# Patient Record
Sex: FEMALE | Race: BLACK OR AFRICAN AMERICAN | NOT HISPANIC OR LATINO | Employment: OTHER | URBAN - METROPOLITAN AREA
[De-identification: names, ages, dates, MRNs, and addresses within clinical notes are randomized per-mention and may not be internally consistent; named-entity substitution may affect disease eponyms.]

---

## 2017-01-10 DIAGNOSIS — H40.10X2 OPEN-ANGLE GLAUCOMA, MODERATE STAGE: ICD-10-CM

## 2017-01-10 DIAGNOSIS — H20.13 CHRONIC IRIDOCYCLITIS, BILATERAL: Primary | ICD-10-CM

## 2017-01-10 RX ORDER — BRIMONIDINE TARTRATE 1.5 MG/ML
1 SOLUTION/ DROPS OPHTHALMIC 2 TIMES DAILY
Qty: 1 BOTTLE | Refills: 12 | Status: SHIPPED | OUTPATIENT
Start: 2017-01-10 | End: 2017-03-22

## 2017-01-10 NOTE — TELEPHONE ENCOUNTER
[facility] [place] called needed Rx for Brimondine for patient.  She saw Dr. Zepeda in March of 2016, he said continue meds.  [facility]

## 2017-02-03 DIAGNOSIS — K59.09 CHRONIC CONSTIPATION: Primary | ICD-10-CM

## 2017-02-03 DIAGNOSIS — H20.13 CHRONIC IRIDOCYCLITIS, BILATERAL: ICD-10-CM

## 2017-02-07 RX ORDER — DOCUSATE SODIUM 100 MG/1
100 CAPSULE, LIQUID FILLED ORAL 2 TIMES DAILY PRN
Qty: 180 CAPSULE | Refills: 4 | Status: SHIPPED | OUTPATIENT
Start: 2017-02-07 | End: 2018-04-29

## 2017-02-07 NOTE — TELEPHONE ENCOUNTER
Pt advised multiple times to schedule appointment, no appointment made. Last ov oct 2015.  Rachael Pringle RN

## 2017-02-21 RX ORDER — PREDNISOLONE ACETATE 10 MG/ML
1 SUSPENSION/ DROPS OPHTHALMIC 2 TIMES DAILY
Qty: 10 ML | Refills: 1 | Status: SHIPPED | OUTPATIENT
Start: 2017-02-21 | End: 2017-03-22

## 2017-02-24 DIAGNOSIS — I25.10 CORONARY ARTERY DISEASE INVOLVING NATIVE CORONARY ARTERY OF NATIVE HEART WITHOUT ANGINA PECTORIS: ICD-10-CM

## 2017-02-24 DIAGNOSIS — I10 HYPERTENSION GOAL BP (BLOOD PRESSURE) < 140/90: ICD-10-CM

## 2017-03-07 DIAGNOSIS — I25.10 CORONARY ARTERY DISEASE INVOLVING NATIVE CORONARY ARTERY OF NATIVE HEART WITHOUT ANGINA PECTORIS: ICD-10-CM

## 2017-03-07 DIAGNOSIS — I10 HYPERTENSION GOAL BP (BLOOD PRESSURE) < 140/90: ICD-10-CM

## 2017-03-09 NOTE — TELEPHONE ENCOUNTER
Last request for refill pharmacy was called and they were sending request to other provider. Patient has not been seen in over a year and appears is seeing other provider. Pharmacy not open at this time and unable to leave message. Maryann Elliott RN

## 2017-03-22 ENCOUNTER — OFFICE VISIT (OUTPATIENT)
Dept: OPTOMETRY | Facility: CLINIC | Age: 72
End: 2017-03-22
Payer: COMMERCIAL

## 2017-03-22 DIAGNOSIS — H35.363 MACULAR DRUSEN, BILATERAL: ICD-10-CM

## 2017-03-22 DIAGNOSIS — H04.123 DRY EYE SYNDROME, BILATERAL: ICD-10-CM

## 2017-03-22 DIAGNOSIS — H52.201 ASTIGMATISM OF RIGHT EYE: ICD-10-CM

## 2017-03-22 DIAGNOSIS — Z98.890 HISTORY OF LASER IRIDOTOMY: ICD-10-CM

## 2017-03-22 DIAGNOSIS — H52.4 MYOPIA WITH PRESBYOPIA OF BOTH EYES: ICD-10-CM

## 2017-03-22 DIAGNOSIS — H21.511: ICD-10-CM

## 2017-03-22 DIAGNOSIS — H40.10X2 OPEN-ANGLE GLAUCOMA OF BOTH EYES, MODERATE STAGE, UNSPECIFIED OPEN-ANGLE GLAUCOMA TYPE: Primary | ICD-10-CM

## 2017-03-22 DIAGNOSIS — Z96.1 PSEUDOPHAKIA: ICD-10-CM

## 2017-03-22 DIAGNOSIS — H20.13 CHRONIC IRIDOCYCLITIS, BILATERAL: ICD-10-CM

## 2017-03-22 DIAGNOSIS — H52.13 MYOPIA WITH PRESBYOPIA OF BOTH EYES: ICD-10-CM

## 2017-03-22 PROCEDURE — 92014 COMPRE OPH EXAM EST PT 1/>: CPT | Performed by: OPTOMETRIST

## 2017-03-22 PROCEDURE — 92015 DETERMINE REFRACTIVE STATE: CPT | Performed by: OPTOMETRIST

## 2017-03-22 RX ORDER — LATANOPROST 50 UG/ML
1 SOLUTION/ DROPS OPHTHALMIC AT BEDTIME
Qty: 2.5 ML | Refills: 1 | Status: SHIPPED | OUTPATIENT
Start: 2017-03-22 | End: 2017-05-05

## 2017-03-22 RX ORDER — CYCLOSPORINE 0.5 MG/ML
1 EMULSION OPHTHALMIC EVERY 12 HOURS
Qty: 1 BOX | Refills: 2 | Status: SHIPPED | OUTPATIENT
Start: 2017-03-22 | End: 2017-06-17

## 2017-03-22 RX ORDER — PREDNISOLONE ACETATE 10 MG/ML
1 SUSPENSION/ DROPS OPHTHALMIC 2 TIMES DAILY
Qty: 10 ML | Refills: 1 | Status: SHIPPED | OUTPATIENT
Start: 2017-03-22 | End: 2017-09-29

## 2017-03-22 RX ORDER — BRIMONIDINE TARTRATE 1.5 MG/ML
1 SOLUTION/ DROPS OPHTHALMIC 2 TIMES DAILY
Qty: 1 BOTTLE | Refills: 1 | Status: SHIPPED | OUTPATIENT
Start: 2017-03-22 | End: 2017-05-05

## 2017-03-22 ASSESSMENT — REFRACTION_MANIFEST
OS_SPHERE: -1.75
OD_ADD: +3.00
OD_CYLINDER: +0.50
OD_AXIS: 175
OS_ADD: +3.00
OD_SPHERE: -0.25
OS_CYLINDER: SPHERE

## 2017-03-22 ASSESSMENT — VISUAL ACUITY
OS_SC: 20/80
OD_CC: 20/40
OD_CC: 20/30
METHOD: SNELLEN - LINEAR
OS_SC: 20/125
OD_SC: 20/30
OD_SC: 20/200
OS_CC: 20/40
CORRECTION_TYPE: GLASSES
OS_CC: 20/40

## 2017-03-22 ASSESSMENT — EXTERNAL EXAM - RIGHT EYE: OD_EXAM: NORMAL

## 2017-03-22 ASSESSMENT — CONF VISUAL FIELD
OS_NORMAL: 1
OD_NORMAL: 1

## 2017-03-22 ASSESSMENT — REFRACTION_WEARINGRX
OS_SPHERE: -1.25
OD_SPHERE: -0.50
SPECS_TYPE: BIFOCAL
OS_ADD: +2.75
OS_CYLINDER: SPHERE
OD_CYLINDER: +0.75
OD_ADD: +2.75
OD_AXIS: 169

## 2017-03-22 ASSESSMENT — EXTERNAL EXAM - LEFT EYE: OS_EXAM: NORMAL

## 2017-03-22 ASSESSMENT — SLIT LAMP EXAM - LIDS
COMMENTS: NORMAL
COMMENTS: NORMAL

## 2017-03-22 ASSESSMENT — TONOMETRY
OD_IOP_MMHG: 18
IOP_METHOD: APPLANATION
OS_IOP_MMHG: 18

## 2017-03-22 ASSESSMENT — CUP TO DISC RATIO
OD_RATIO: 0.5
OS_RATIO: 0.6

## 2017-03-22 NOTE — PATIENT INSTRUCTIONS
A final glasses prescription was given.  Allow time for adaptation.  The glasses may cause dizziness and affect depth perception for awhile.  Continue glaucoma medications as directed  Restasis ophthalmic emulsion, place 1 drop in each eye 2 times per day  Pred Forte 1%, place 1 drop in right eye 2 times daily  Systane Balance, place 1 drop in each eye 2-4 times daily  Return to clinic 1 year for Comprehensive Vision Exam      Trisha Sepulveda O.D  [address]  [address] [phone]

## 2017-03-22 NOTE — PROGRESS NOTES
Chief Complaint   Patient presents with     COMPREHENSIVE EYE EXAM      Accompanied by self  Last Eye Exam: 1 year ago  Dilated Previously: Yes    What are you currently using to see?  glasses       Distance Vision Acuity: Noticed gradual change in both eyes    Near Vision Acuity: Not satisfied     Eye Comfort: dry and itchy  Do you use eye drops? : Yes:   Occupation or Hobbies: retired    Abby Villasenor, Optometric Tech          Medical, surgical and family histories reviewed and updated 3/22/2017.       OBJECTIVE: See Ophthalmology exam    ASSESSMENT:    ICD-10-CM    1. Open-angle glaucoma of both eyes, moderate stage, unspecified open-angle glaucoma type H40.10X2 EYE EXAM (SIMPLE-NONBILLABLE)     OPHTHALMOLOGY ADULT REFERRAL     latanoprost (XALATAN) 0.005 % ophthalmic solution     brimonidine (ALPHAGAN-P) 0.15 % ophthalmic solution   2. Macular drusen, bilateral H35.363 EYE EXAM (SIMPLE-NONBILLABLE)   3. 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 Pseudophakia, ou Z96.1 EYE EXAM (SIMPLE-NONBILLABLE)   5. History of laser iridotomy, ou Z98.890 EYE EXAM (SIMPLE-NONBILLABLE)   6. Dry eye syndrome, bilateral H04.123 EYE EXAM (SIMPLE-NONBILLABLE)     propylene glycol (SYSTANE BALANCE) 0.6 % SOLN ophthalmic solution   7. Myopia with presbyopia of both eyes H52.13 EYE EXAM (SIMPLE-NONBILLABLE)    H52.4 REFRACTION   8. Astigmatism of right eye H52.201 EYE EXAM (SIMPLE-NONBILLABLE)     REFRACTION   9. Chronic iridocyclitis, bilateral H20.13 prednisoLONE acetate (PRED FORTE) 1 % ophthalmic susp     cycloSPORINE (RESTASIS) 0.05 % ophthalmic emulsion     brimonidine (ALPHAGAN-P) 0.15 % ophthalmic solution      PLAN:   A final glasses prescription was given.  Allow time for adaptation.  The glasses may cause dizziness and affect depth perception for awhile.  Continue glaucoma medications as directed  Restasis ophthalmic emulsion, place 1 drop in each eye 2 times per day  Pred Forte 1%, place  1 drop in right eye 2 times daily  Systane Balance, place 1 drop in each eye 2-4 times daily  Return to clinic 1 year for Comprehensive Vision Exam      Trisha Sepulveda O.D  [address]  [place]  [address]    [phone]

## 2017-03-22 NOTE — MR AVS SNAPSHOT
After Visit Summary   3/22/2017    Loly Govea    MRN: [id number]           Patient Information     Date Of Birth          1945        Visit Information        Provider Department      3/22/2017 2:00 PM Trisha Sepulveda, OD [facility]        Today's Diagnoses     Open-angle glaucoma of both eyes, moderate stage, unspecified open-angle glaucoma type    -  1    Macular drusen, bilateral        Anterior synechiae of iris, right        Pseudophakia, ou        History of laser iridotomy, ou        Dry eye syndrome, bilateral        Myopia with presbyopia of both eyes        Astigmatism of right eye        Chronic iridocyclitis, bilateral          Care Instructions        A final glasses prescription was given.  Allow time for adaptation.  The glasses may cause dizziness and affect depth perception for awhile.  Continue glaucoma medications as directed  Restasis ophthalmic emulsion, place 1 drop in each eye 2 times per day  Pred Forte 1%, place 1 drop in right eye 2 times daily  Systane Balance, place 1 drop in each eye 2-4 times daily  Return to clinic 1 year for Comprehensive Vision Exam      Trisha Sepulveda O.D  [address] [place]  [address]    [phone]                Follow-ups after your visit        Additional Services     OPHTHALMOLOGY ADULT REFERRAL       Your provider has referred you to:  [facility]: [facility] [phone]   [email or web address]      Please be aware that coverage of these services is subject to the terms and limitations of your health insurance plan.  Call member services at your health plan with any benefit or coverage questions.      Please bring the following to your appointment:  >>   Any x-rays, CTs or MRIs which have been performed.  Contact the facility where they were done to arrange for  prior to your scheduled appointment.  Any new CT, MRI or other  procedures ordered by your specialist must be performed at a [facility] facility or coordinated by your clinic's referral office.    >>   List of current medications   >>   This referral request   >>   Any documents/labs given to you for this referral                  Follow-up notes from your care team     Return in about 1 year (around 3/22/2018) for Eye Exam.      Who to contact     If you have questions or need follow up information about today's clinic visit or your schedule please contact [facility] directly at [phone].  Normal or non-critical lab and imaging results will be communicated to you by Enforahart, letter or phone within 4 business days after the clinic has received the results. If you do not hear from us within 7 days, please contact the clinic through [email or web address] or phone. If you have a critical or abnormal lab result, we will notify you by phone as soon as possible.  Submit refill requests through Octoshape or call your pharmacy and they will forward the refill request to us. Please allow 3 business days for your refill to be completed.          Additional Information About Your Visit        Enforahart Information     Octoshape gives you secure access to your electronic health record. If you see a primary care provider, you can also send messages to your care team and make appointments. If you have questions, please call your primary care clinic.  If you do not have a primary care provider, please call [phone] and they will assist you.        Care EveryWhere ID     This is your Care EveryWhere ID. This could be used by other organizations to access your [facility] medical records  [id number]         Blood Pressure from Last 3 Encounters:   09/15/15 90/60   04/28/15 120/72   12/18/14 (!) 86/55    Weight from Last 3 Encounters:   09/15/15 94.3 kg (208 lb)   04/28/15 86.2 kg (190 lb)   03/24/15 85.3 kg (188 lb)              We Performed the Following     EYE EXAM (SIMPLE-NONBILLABLE)      OPHTHALMOLOGY ADULT REFERRAL     REFRACTION          Today's Medication Changes          These changes are accurate as of: 3/22/17  4:18 PM.  If you have any questions, ask your nurse or doctor.               Start taking these medicines.        Dose/Directions    propylene glycol 0.6 % Soln ophthalmic solution   Commonly known as:  SYSTANE BALANCE   Used for:  Dry eye syndrome, bilateral   Started by:  Trisha Sepulveda, OD        Dose:  1 drop   Place 1 drop into both eyes 4 times daily   Quantity:  1 Bottle   Refills:  11            Where to get your medicines      These medications were sent to [id number] - [place], [place] - [address]  [address] [place] [address]     Phone:  [phone]     brimonidine 0.15 % ophthalmic solution    cycloSPORINE 0.05 % ophthalmic emulsion    latanoprost 0.005 % ophthalmic solution    prednisoLONE acetate 1 % ophthalmic susp    propylene glycol 0.6 % Soln ophthalmic solution                Primary Care Provider Office Phone # Fax #    Rigo Cheng [phone]       [facility] [address] [facility] [place] [address]        Thank you!     Thank you for choosing [facility]  for your care. Our goal is always to provide you with excellent care. Hearing back from our patients is one way we can continue to improve our services. Please take a few minutes to complete the written survey that you may receive in the mail after your visit with us. Thank you!             Your Updated Medication List - Protect others around you: Learn how to safely use, store and throw away your medicines at www.disposemymeds.org.          This list is accurate as of: 3/22/17  4:18 PM.  Always use your most recent med list.                   Brand Name Dispense Instructions for use    acetaminophen 500 MG Caps     200 capsule    Take 1-2 capsules by mouth every 6 hours As needed for pain       * albuterol 108 (90 BASE) MCG/ACT Inhaler     PROAIR HFA/PROVENTIL HFA/VENTOLIN HFA    3 Inhaler    Inhale 2 puffs into the lungs every 4 hours as needed for shortness of breath / dyspnea or wheezing (for shortness of breath/ dyspnea)       * albuterol (2.5 MG/3ML) 0.083% neb solution     30 vial    Take 3 mLs (2.5 mg) by nebulization every 6 hours as needed for shortness of breath / dyspnea       alendronate 70 MG tablet    FOSAMAX    4 tablet    Take 1 tablet (70 mg) by mouth every 7 days Take with over 8 ounces water and stay upright for at least 30 minutes after dose.  Take at least 60 minutes before breakfast.  OVERDUE FOR DEXA SCAN       aspirin 81 MG tablet     90 tablet    Take 1 tablet (81 mg) by mouth daily       atenolol 50 MG tablet    TENORMIN    90 tablet    Take 1 tablet (50 mg) by mouth daily       atorvastatin 20 MG tablet    LIPITOR    90 tablet    Take 1 tablet (20 mg) by mouth daily for cholesterol Pharmacy ok to hold prescription until due       azelastine 0.1 % spray    ASTELIN    1 Bottle    Spray 1 spray in nostril 2 times daily as needed for rhinitis       brimonidine 0.15 % ophthalmic solution    ALPHAGAN-P    1 Bottle    Place 1 drop into both eyes 2 times daily       budesonide-formoterol 160-4.5 MCG/ACT Inhaler    SYMBICORT    3 Inhaler    Inhale 2 puffs into the lungs 2 times daily For asthma control Pharmacy ok to hold prescription until due       buPROPion 150 MG 12 hr tablet    WELLBUTRIN SR    60 tablet    Take 1 tablet once daily and increase to 1 tablet twice daily after 4 to 7 days (replaces cymbalta for energy/ weight loss/depression)       CALCIUM 600/VITAMIN D3 600-800 MG-UNIT Tabs   Generic drug:  Calcium Carb-Cholecalciferol     1 tablet    Take 1 tablet by mouth 2 times daily With magnesium 50 mg       CRANBERRY      1 cap daily       cycloSPORINE 0.05 % ophthalmic emulsion    RESTASIS    1 Box    Place 1 drop into both eyes every 12 hours       diazepam 2 MG tablet    VALIUM    30 tablet    1-2 pills every 12 hours a  needed for muscle spasms or sleep max#30/month don't take with percocet       diphenhydrAMINE 25 MG tablet    BENADRYL    60 tablet    Take 1-2 tablets (25-50 mg) by mouth every 6 hours as needed for itching or allergies APPT NEEDED FOR FURTHER REFILLS       docusate sodium 100 MG capsule    COLACE    180 capsule    Take 1 capsule (100 mg) by mouth 2 times daily as needed for constipation       DULoxetine 60 MG EC capsule    CYMBALTA    90 capsule    Take 1 capsule (60 mg) by mouth daily       EQL CENTRAL-KAVEH SELECT Tabs     100 tablet    Take 1 tablet by mouth daily APPT NEEDED FOR FURTHER REFILLS       furosemide 20 MG tablet    LASIX    90 tablet    Take 1 tablet (20 mg) by mouth daily TAKE AT THE SAME TIME AS POTASSIUM. Pharmacy ok to hold prescription until due       HYDROPHOR Oint     454 g    Apply sparingly topically as needed for dry skin       isosorbide mononitrate 30 MG 24 hr tablet    IMDUR    90 tablet    Take 3 tablets (90 mg) by mouth daily       latanoprost 0.005 % ophthalmic solution    XALATAN    2.5 mL    Place 1 drop into both eyes At Bedtime       lisinopril 20 MG tablet    PRINIVIL/ZESTRIL    180 tablet    Take 1 tablet (20 mg) by mouth 2 times daily This is for blood pressure minus the hydrochlorothiazine       LUMIGAN 0.03 % ophthalmic solution   Generic drug:  bimatoprost     1 Bottle    Place 1 drop into both eyes At Bedtime       magnesium 250 MG tablet     30 tablet    Take 1 tablet by mouth daily       metoprolol 50 MG tablet    LOPRESSOR    180 tablet    Take 1 tablet (50 mg) by mouth 2 times daily       nitroglycerin 0.4 MG sublingual tablet    NITROSTAT    20 tablet    Place 1 tablet under the tongue. 1 pill every as needed for chest pain. maybe repeat [id number] x3 total than call 911 if pain persists       order for DME     1 each    Equipment being ordered: Nebulizer with tubing and mouthpiece or mask       oxyCODONE-acetaminophen 5-325 MG per tablet    PERCOCET    60 tablet     1/2 to 1 whole pill every 6 hours as needed for pain. Max #60/month       pantoprazole 40 MG EC tablet    PROTONIX    90 tablet    Take 1 tablet (40 mg) by mouth daily For heartburn.       polyethylene glycol 0.4%- propylene glycol 0.3% 0.4-0.3 % Soln ophthalmic solution    SYSTANE ULTRA    1 Bottle    Place 1 drop into both eyes 4 times daily as needed for dry eyes       polyethylene glycol powder    MIRALAX    510 g    Take 17 g by mouth daily       potassium chloride SA 10 MEQ CR tablet    K-DUR/KLOR-CON M    90 tablet    Take 1 tablet (10 mEq) by mouth daily With lasix Pharmacy ok to hold prescription until due       prednisoLONE acetate 1 % ophthalmic susp    PRED FORTE    10 mL    Place 1 drop into the right eye 2 times daily       propylene glycol 0.6 % Soln ophthalmic solution    SYSTANE BALANCE    1 Bottle    Place 1 drop into both eyes 4 times daily       rOPINIRole 0.25 MG tablet    REQUIP    30 tablet    Take 1 tablet (0.25 mg) by mouth At Bedtime For restless legs. May double dosage if not effective       senna 8.6 MG tablet    SENOKOT    180 tablet    Take 1 tablet by mouth 2 times daily as needed for constipation       sodium chloride 0.65 % nasal spray    OCEAN NASAL SPRAY    1 Bottle    Spray 1 spray into both nostrils daily as needed for congestion       tiZANidine 2 MG tablet    ZANAFLEX    90 tablet    Take 1 tablet (2 mg) by mouth 3 times daily as needed for muscle spasms       triamcinolone 0.025 % cream    KENALOG    90 g    Apply topically 2 times daily as needed (to rash)       * Notice:  This list has 2 medication(s) that are the same as other medications prescribed for you. Read the directions carefully, and ask your doctor or other care provider to review them with you.

## 2017-04-03 DIAGNOSIS — I10 ESSENTIAL HYPERTENSION WITH GOAL BLOOD PRESSURE LESS THAN 140/90: ICD-10-CM

## 2017-04-03 DIAGNOSIS — G25.81 RESTLESS LEGS SYNDROME: ICD-10-CM

## 2017-04-03 RX ORDER — ATENOLOL 50 MG/1
50 TABLET ORAL DAILY
Qty: 90 TABLET | Refills: 1 | OUTPATIENT
Start: 2017-04-03

## 2017-04-03 RX ORDER — ROPINIROLE 0.25 MG/1
0.25 TABLET, FILM COATED ORAL AT BEDTIME
Qty: 30 TABLET | Refills: 10 | OUTPATIENT
Start: 2017-04-03

## 2017-04-07 DIAGNOSIS — I10 ESSENTIAL HYPERTENSION WITH GOAL BLOOD PRESSURE LESS THAN 140/90: ICD-10-CM

## 2017-04-07 RX ORDER — ATENOLOL 50 MG/1
50 TABLET ORAL DAILY
Qty: 90 TABLET | Refills: 3 | Status: SHIPPED | OUTPATIENT
Start: 2017-04-07

## 2017-04-10 DIAGNOSIS — G25.81 RESTLESS LEGS SYNDROME: ICD-10-CM

## 2017-04-10 RX ORDER — ROPINIROLE 0.25 MG/1
0.25 TABLET, FILM COATED ORAL AT BEDTIME
Qty: 30 TABLET | Refills: 10 | Status: CANCELLED | OUTPATIENT
Start: 2017-04-10

## 2017-04-12 NOTE — TELEPHONE ENCOUNTER
Request for requip refill. Patient no longer seeing provider at this clinic. Pharmacy has been notified that refills need to go to other provider. Maryann Elliott RN

## 2017-04-13 DIAGNOSIS — G25.81 RESTLESS LEGS SYNDROME: ICD-10-CM

## 2017-04-14 DIAGNOSIS — G25.81 RESTLESS LEGS SYNDROME: ICD-10-CM

## 2017-04-14 RX ORDER — ROPINIROLE 0.25 MG/1
TABLET, FILM COATED ORAL
Qty: 1 TABLET | Refills: 0 | OUTPATIENT
Start: 2017-04-14

## 2017-04-14 RX ORDER — ROPINIROLE 0.25 MG/1
0.25 TABLET, FILM COATED ORAL AT BEDTIME
Qty: 30 TABLET | Refills: 10 | OUTPATIENT
Start: 2017-04-14

## 2017-05-03 DIAGNOSIS — M81.0 OSTEOPOROSIS: ICD-10-CM

## 2017-05-03 RX ORDER — ALENDRONATE SODIUM 70 MG/1
70 TABLET ORAL
Qty: 4 TABLET | Refills: 3 | OUTPATIENT
Start: 2017-05-03

## 2017-06-12 DIAGNOSIS — M79.7 FIBROMYALGIA: ICD-10-CM

## 2017-06-17 DIAGNOSIS — H20.13 CHRONIC IRIDOCYCLITIS, BILATERAL: ICD-10-CM

## 2017-06-19 DIAGNOSIS — M79.7 FIBROMYALGIA: ICD-10-CM

## 2017-06-19 RX ORDER — CYCLOSPORINE 0.5 MG/ML
EMULSION OPHTHALMIC
Qty: 60 EACH | Refills: 1 | Status: SHIPPED | OUTPATIENT
Start: 2017-06-19 | End: 2017-11-03

## 2017-06-19 NOTE — TELEPHONE ENCOUNTER
cycloSPORINE (RESTASIS) 0.05 % ophthalmic emulsion      Last Written Prescription Date:  3/22/2017  Last Fill Quantity: 1 box,   # refills: 2  [place] [facility] or [facility] prescribing provider: 3/22/2017  Future Office visit:       Routing refill request to provider for review/approval because:  Drug not on the [facility] [facility] or [facility] refill protocol or controlled substance

## 2017-08-31 DIAGNOSIS — H40.10X2 OPEN-ANGLE GLAUCOMA OF BOTH EYES, MODERATE STAGE, UNSPECIFIED OPEN-ANGLE GLAUCOMA TYPE: ICD-10-CM

## 2017-08-31 RX ORDER — LATANOPROST 50 UG/ML
SOLUTION/ DROPS OPHTHALMIC
Qty: 2.5 ML | Refills: 0 | Status: SHIPPED | OUTPATIENT
Start: 2017-08-31 | End: 2018-05-22

## 2017-08-31 NOTE — TELEPHONE ENCOUNTER
Routing refill request to provider for review/approval because:  Priti given x1 and patient did not follow up, please advise      Catia Peñaloza RN - BC

## 2017-08-31 NOTE — TELEPHONE ENCOUNTER
latanoprost (XALATAN) 0.005 % ophthalmic solution   Last Written Prescription Date: 05/08/2017  Last Fill Quantity: 2.5mL,  # refills: 1   Last Office Visit with [facility], [facility] or [facility] prescribing provider: 10/16/2015                                           Anna Zarate MA

## 2017-09-22 DIAGNOSIS — M81.0 OSTEOPOROSIS: ICD-10-CM

## 2017-09-22 RX ORDER — ALENDRONATE SODIUM 70 MG/1
70 TABLET ORAL
Qty: 4 TABLET | Refills: 3 | OUTPATIENT
Start: 2017-09-22

## 2017-09-29 DIAGNOSIS — H20.13 CHRONIC IRIDOCYCLITIS, BILATERAL: ICD-10-CM

## 2017-09-29 RX ORDER — PREDNISOLONE ACETATE 10 MG/ML
SUSPENSION/ DROPS OPHTHALMIC
Qty: 5 ML | Refills: 0 | Status: SHIPPED | OUTPATIENT
Start: 2017-09-29 | End: 2018-05-22

## 2017-09-29 NOTE — TELEPHONE ENCOUNTER
Refilled Prednisolone x1 for patient.  She really needs to get in with Dr. Zepeda, no showed last appt and saw Dr. Sepulveda in between as well.  Needs Glaucoma OCT and HVF,  to call patient for appointment.

## 2017-10-09 DIAGNOSIS — M81.0 OSTEOPOROSIS, UNSPECIFIED OSTEOPOROSIS TYPE, UNSPECIFIED PATHOLOGICAL FRACTURE PRESENCE: Primary | ICD-10-CM

## 2017-10-10 NOTE — TELEPHONE ENCOUNTER
Routing refill request to provider for review/approval because:  Labs not current:  Last Dexa scan has been more than 2 years (2011), and no primary care office visit noted since 2015.     alendronate (FOSAMAX) 70 MG tablet 4 tablet 3 5/8/2017  No   Sig: Take 1 tablet (70 mg) by mouth every 7 days Take with over 8 ounces water and stay upright for at least 30 minutes after dose.  Take at least 60 minutes before breakfast.  OVERDUE FOR DEXA SCAN     Last Office Visit with [facility] or [facility] prescribing provider: 9/15/15     DEXA Scan:  Last order of DX HIP/PELVIS/SPINE was found on 12/16/2011 from Orders Only on 12/16/2011     No order of DX HIP/PELVIS/SPINE W LAT FRACTION ANALYSIS is found.     Creatinine   Date Value Ref Range Status   04/28/2015 1.23 (H) 0.52 - 1.04 mg/dL Final     Gabriela Peralta RN

## 2017-10-11 PROBLEM — M81.0 OSTEOPOROSIS, UNSPECIFIED OSTEOPOROSIS TYPE, UNSPECIFIED PATHOLOGICAL FRACTURE PRESENCE: Status: ACTIVE | Noted: 2017-10-11

## 2017-10-12 RX ORDER — ALENDRONATE SODIUM 70 MG/1
70 TABLET ORAL
Qty: 4 TABLET | Refills: 3 | Status: SHIPPED | OUTPATIENT
Start: 2017-10-12 | End: 2018-01-04

## 2017-12-20 DIAGNOSIS — H04.123 DRY EYE SYNDROME, BILATERAL: ICD-10-CM

## 2017-12-20 RX ORDER — PROPYLENE GLYCOL 0.06 MG/ML
EMULSION OPHTHALMIC
Qty: 10 ML | Refills: 10 | Status: SHIPPED | OUTPATIENT
Start: 2017-12-20

## 2018-04-05 DIAGNOSIS — H40.10X2 OPEN-ANGLE GLAUCOMA OF BOTH EYES, MODERATE STAGE, UNSPECIFIED OPEN-ANGLE GLAUCOMA TYPE: ICD-10-CM

## 2018-04-05 DIAGNOSIS — H20.13 CHRONIC IRIDOCYCLITIS, BILATERAL: ICD-10-CM

## 2018-04-05 RX ORDER — LATANOPROST 50 UG/ML
SOLUTION/ DROPS OPHTHALMIC
Qty: 2.5 ML | Refills: 0 | OUTPATIENT
Start: 2018-04-05

## 2018-04-05 RX ORDER — BRIMONIDINE TARTRATE 1.5 MG/ML
SOLUTION/ DROPS OPHTHALMIC
Qty: 5 ML | Refills: 0 | OUTPATIENT
Start: 2018-04-05

## 2018-04-05 NOTE — TELEPHONE ENCOUNTER
Routing refill request to provider for review/approval because:  Drug not on the [facility] [place]       Requested Prescriptions   Pending Prescriptions Disp Refills     brimonidine (ALPHAGAN-P) 0.15 % ophthalmic solution [Pharmacy Med Name: Brimonidine Tartrate Ophthalmic Solution 0.15 %]  Last Written Prescription Date:  5/8/17  Last Fill Quantity: 5 mL,  # refills: 1   Last office visit: 3/22/2017 with prescribing provider:     Future Office Visit:     5 mL 0     Sig: INSTILL 1 DROP INTO BOTH EYES TWO TIMES DAILY    There is no refill protocol information for this order        latanoprost (XALATAN) 0.005 % ophthalmic solution [Pharmacy Med Name: Latanoprost Ophthalmic Solution 0.005 %]  Last Written Prescription Date:  8/31/17  Last Fill Quantity: 2.5 mL,  # refills: 0   Last office visit: 3/22/2017 with prescribing provider:     Future Office Visit:     2.5 mL 0     Sig: INSTILL 1 DROP INTO BOTH EYES AT BEDTIME    There is no refill protocol information for this order        Catia Peñaloza RN - BC

## 2018-04-06 DIAGNOSIS — H40.10X2 OPEN-ANGLE GLAUCOMA OF BOTH EYES, MODERATE STAGE, UNSPECIFIED OPEN-ANGLE GLAUCOMA TYPE: ICD-10-CM

## 2018-04-06 DIAGNOSIS — H20.13 CHRONIC IRIDOCYCLITIS, BILATERAL: ICD-10-CM

## 2018-04-06 RX ORDER — PREDNISOLONE ACETATE 10 MG/ML
SUSPENSION/ DROPS OPHTHALMIC
Qty: 5 ML | Refills: 0 | Status: CANCELLED | OUTPATIENT
Start: 2018-04-06

## 2018-04-06 NOTE — TELEPHONE ENCOUNTER
Patient has not been seen in two years.  Requesting refill for Prednisolone.  Pharmacy will tell her she needs to make appointment for more refills.

## 2018-04-06 NOTE — TELEPHONE ENCOUNTER
Reason for call:  Medication   If this is a refill request, has the caller requested the refill from the pharmacy already? Yes  Will the patient be using a [facility]? No  Name of the pharmacy and phone number for the current request: [facility] [place]    Name of the medication requested: prednisoLONE acetate (PRED FORTE) 1 % ophthalmic susp    Other request: last dispensed 9/29/2017    Phone number to reach patient:  [phone]    Best Time:  n/a    Can we leave a detailed message on this number?  YES

## 2018-04-19 DIAGNOSIS — M81.0 OSTEOPOROSIS, UNSPECIFIED OSTEOPOROSIS TYPE, UNSPECIFIED PATHOLOGICAL FRACTURE PRESENCE: ICD-10-CM

## 2018-04-19 RX ORDER — ALENDRONATE SODIUM 70 MG/1
TABLET ORAL
Qty: 4 TABLET | Refills: 1 | OUTPATIENT
Start: 2018-04-19

## 2018-04-19 NOTE — TELEPHONE ENCOUNTER
Pt has not been seen by provider in greater than two years. Pt has established care with a different provider.  Pharmacy will send refill to new primary care provider for pt.  Rachael Pringle RN

## 2018-05-15 ENCOUNTER — OFFICE VISIT (OUTPATIENT)
Dept: OPHTHALMOLOGY | Facility: CLINIC | Age: 73
End: 2018-05-15
Payer: COMMERCIAL

## 2018-05-15 DIAGNOSIS — H35.363 MACULAR DRUSEN, BILATERAL: ICD-10-CM

## 2018-05-15 DIAGNOSIS — H26.492 POSTERIOR CAPSULAR OPACIFICATION VISUALLY SIGNIFICANT OF LEFT EYE: ICD-10-CM

## 2018-05-15 DIAGNOSIS — H20.13 CHRONIC IRIDOCYCLITIS, BILATERAL: ICD-10-CM

## 2018-05-15 DIAGNOSIS — H40.10X2 OPEN-ANGLE GLAUCOMA OF BOTH EYES, MODERATE STAGE, UNSPECIFIED OPEN-ANGLE GLAUCOMA TYPE: ICD-10-CM

## 2018-05-15 DIAGNOSIS — H52.4 PRESBYOPIA: ICD-10-CM

## 2018-05-15 DIAGNOSIS — Z96.1 PSEUDOPHAKIA: ICD-10-CM

## 2018-05-15 DIAGNOSIS — Z01.01 ENCOUNTER FOR EXAMINATION OF EYES AND VISION WITH ABNORMAL FINDINGS: Primary | ICD-10-CM

## 2018-05-15 DIAGNOSIS — H21.511: ICD-10-CM

## 2018-05-15 PROCEDURE — 92015 DETERMINE REFRACTIVE STATE: CPT | Performed by: OPHTHALMOLOGY

## 2018-05-15 PROCEDURE — 92014 COMPRE OPH EXAM EST PT 1/>: CPT | Performed by: OPHTHALMOLOGY

## 2018-05-15 ASSESSMENT — TONOMETRY
IOP_METHOD: APPLANATION
OS_IOP_MMHG: 23
OD_IOP_MMHG: 17

## 2018-05-15 ASSESSMENT — REFRACTION_MANIFEST
OS_SPHERE: -1.75
OS_ADD: +2.75
OD_SPHERE: -0.50
OD_AXIS: 171
OS_CYLINDER: +0.25
OS_AXIS: 119
OD_CYLINDER: +0.75
OD_ADD: +2.75

## 2018-05-15 ASSESSMENT — REFRACTION_WEARINGRX
OD_CYLINDER: +0.75
OD_SPHERE: -0.50
OD_AXIS: 169
OS_CYLINDER: SPHERE
SPECS_TYPE: BIFOCAL
OS_ADD: +2.75
OD_ADD: +2.75
OS_SPHERE: -1.25

## 2018-05-15 ASSESSMENT — CONF VISUAL FIELD
OD_NORMAL: 1
OS_NORMAL: 1

## 2018-05-15 ASSESSMENT — EXTERNAL EXAM - RIGHT EYE: OD_EXAM: NORMAL

## 2018-05-15 ASSESSMENT — CUP TO DISC RATIO
OS_RATIO: 0.6
OD_RATIO: 0.5

## 2018-05-15 ASSESSMENT — VISUAL ACUITY
OD_CC: 20/25
OS_CC: 20/40
OD_CC+: -2
CORRECTION_TYPE: GLASSES
METHOD: SNELLEN - LINEAR

## 2018-05-15 ASSESSMENT — SLIT LAMP EXAM - LIDS
COMMENTS: NORMAL
COMMENTS: NORMAL

## 2018-05-15 ASSESSMENT — EXTERNAL EXAM - LEFT EYE: OS_EXAM: NORMAL

## 2018-05-15 NOTE — MR AVS SNAPSHOT
After Visit Summary   5/15/2018    Loly Govea    MRN: [id number]           Patient Information     Date Of Birth          1945        Visit Information        Provider Department      5/15/2018 3:00 PM William Zepeda MD [facility]        Today's Diagnoses     Encounter for examination of eyes and vision with abnormal findings    -  1    Presbyopia          Care Instructions    Continue same medications.  Return visit next available at convenience for Yag laser left eye.  William Zepeda M.D.  [phone]            Follow-ups after your visit        Who to contact     If you have questions or need follow up information about today's clinic visit or your schedule please contact [facility] directly at [phone].  Normal or non-critical lab and imaging results will be communicated to you by MyChart, letter or phone within 4 business days after the clinic has received the results. If you do not hear from us within 7 days, please contact the clinic through MyChart or phone. If you have a critical or abnormal lab result, we will notify you by phone as soon as possible.  Submit refill requests through [employer] or call your pharmacy and they will forward the refill request to us. Please allow 3 business days for your refill to be completed.          Additional Information About Your Visit        MyChart Information     [employer] gives you secure access to your electronic health record. If you see a primary care provider, you can also send messages to your care team and make appointments. If you have questions, please call your primary care clinic.  If you do not have a primary care provider, please call [phone] and they will assist you.        Care EveryWhere ID     This is your Care EveryWhere ID. This could be used by other organizations to access your [facility] medical records  [id number]         Blood Pressure from Last 3 Encounters:   09/15/15 90/60    04/28/15 120/72   12/18/14 (!) 86/55    Weight from Last 3 Encounters:   09/15/15 94.3 kg (208 lb)   04/28/15 86.2 kg (190 lb)   03/24/15 85.3 kg (188 lb)              Today, you had the following     No orders found for display       Primary Care Provider Office Phone # Fax #    Rigo Cheng [phone] [address] [facility] [address]        Equal Access to Services     [facility] : Hadii aad ku hadasho Soomaali, waaxda luqadaha, qaybta kaalmada adeegyada, waxay idiin hayaan adeeg khtalon ray . So [facility] [phone].    ATENCIÓN: Si habla español, tiene a schwab disposición servicios gratuitos de asistencia lingüística. [facility] [phone].    We comply with applicable federal civil rights laws and [place] laws. We do not discriminate on the basis of race, color, national origin, age, disability, sex, sexual orientation, or gender identity.            Thank you!     Thank you for choosing [facility]  for your care. Our goal is always to provide you with excellent care. Hearing back from our patients is one way we can continue to improve our services. Please take a few minutes to complete the written survey that you may receive in the mail after your visit with us. Thank you!             Your Updated Medication List - Protect others around you: Learn how to safely use, store and throw away your medicines at www.disposemymeds.org.          This list is accurate as of 5/15/18  4:34 PM.  Always use your most recent med list.                   Brand Name Dispense Instructions for use Diagnosis    acetaminophen 500 MG Caps     200 capsule    Take 1-2 capsules by mouth every 6 hours As needed for pain    Fibromyalgia       * albuterol 108 (90 Base) MCG/ACT Inhaler    PROAIR HFA/PROVENTIL HFA/VENTOLIN HFA    3 Inhaler    Inhale 2 puffs into the lungs every 4 hours as needed for shortness of breath / dyspnea or wheezing (for shortness of breath/ dyspnea)    Mild  persistent asthma       * albuterol (2.5 MG/3ML) 0.083% neb solution     30 vial    Take 3 mLs (2.5 mg) by nebulization every 6 hours as needed for shortness of breath / dyspnea    Mild persistent asthma       alendronate 70 MG tablet    FOSAMAX    4 tablet    TAKE 1 TABLET BY MOUTH EVERY 7 DAYS. TAKE WITH OVER 8 OUNCES OF WATER AND STAY UPRIGHT FOR AT LEAST 30 MINUTES AFTER DOSE. TAKE AT LEAST 60    Osteoporosis, unspecified osteoporosis type, unspecified pathological fracture presence       aspirin 81 MG tablet     90 tablet    Take 1 tablet (81 mg) by mouth daily    Hypertension goal BP (blood pressure) < 140/90, Coronary artery disease involving native coronary artery of native heart without angina pectoris       atenolol 50 MG tablet    TENORMIN    90 tablet    Take 1 tablet (50 mg) by mouth daily    Essential hypertension with goal blood pressure less than 140/90       atorvastatin 20 MG tablet    LIPITOR    90 tablet    Take 1 tablet (20 mg) by mouth daily for cholesterol Pharmacy ok to hold prescription until due    Hyperlipidemia LDL goal <130       azelastine 0.1 % spray    ASTELIN    1 Bottle    Spray 1 spray in nostril 2 times daily as needed for rhinitis    Sinus congestion       brimonidine 0.15 % ophthalmic solution    ALPHAGAN-P    5 mL    INSTILL 1 DROP INTO BOTH EYES TWO TIMES DAILY    Chronic iridocyclitis, bilateral, Open-angle glaucoma of both eyes, moderate stage, unspecified open-angle glaucoma type       budesonide-formoterol 160-4.5 MCG/ACT Inhaler    SYMBICORT    3 Inhaler    Inhale 2 puffs into the lungs 2 times daily For asthma control Pharmacy ok to hold prescription until due    Mild persistent asthma, uncomplicated       buPROPion 150 MG 12 hr tablet    WELLBUTRIN SR    60 tablet    Take 1 tablet once daily and increase to 1 tablet twice daily after 4 to 7 days (replaces cymbalta for energy/ weight loss/depression)    Major depressive disorder, recurrent episode, mild (H)       CALCIUM  600/VITAMIN D3 600-800 MG-UNIT Tabs   Generic drug:  Calcium Carb-Cholecalciferol     1 tablet    Take 1 tablet by mouth 2 times daily With magnesium 50 mg    Osteopenia       CRANBERRY      1 cap daily        diazepam 2 MG tablet    VALIUM    30 tablet    1-2 pills every 12 hours a needed for muscle spasms or sleep max#30/month don't take with percocet    Chronic neck pain, Chronic low back pain       diphenhydrAMINE 25 MG tablet    BENADRYL    60 tablet    Take 1-2 tablets (25-50 mg) by mouth every 6 hours as needed for itching or allergies APPT NEEDED FOR FURTHER REFILLS    Rash        MG capsule   Generic drug:  docusate sodium     180 capsule    TAKE 1 CAPSULE BY MOUTH TWICE A DAY AS NEEDED FOR CONSTIPATION    Chronic constipation       DULoxetine 60 MG EC capsule    CYMBALTA    90 capsule    Take 1 capsule (60 mg) by mouth daily    Fibromyalgia       EQL CENTRAL-KAVEH SELECT Tabs     100 tablet    Take 1 tablet by mouth daily APPT NEEDED FOR FURTHER REFILLS    Osteoporosis, Vertigo       furosemide 20 MG tablet    LASIX    90 tablet    Take 1 tablet (20 mg) by mouth daily TAKE AT THE SAME TIME AS POTASSIUM. Pharmacy ok to hold prescription until due    Hypertension goal BP (blood pressure) < 140/90       HYDROPHOR Oint     454 g    Apply sparingly topically as needed for dry skin    Dry skin dermatitis       isosorbide mononitrate 30 MG 24 hr tablet    IMDUR    90 tablet    Take 3 tablets (90 mg) by mouth daily    Hypertension goal BP (blood pressure) < 140/90       latanoprost 0.005 % ophthalmic solution    XALATAN    2.5 mL    INSTILL 1 DROP INTO BOTH EYES AT BEDTIME    Open-angle glaucoma of both eyes, moderate stage, unspecified open-angle glaucoma type       lisinopril 20 MG tablet    PRINIVIL/ZESTRIL    180 tablet    Take 1 tablet (20 mg) by mouth 2 times daily This is for blood pressure minus the hydrochlorothiazine    Hypertension goal BP (blood pressure) < 140/90       LUMIGAN 0.03 % ophthalmic  solution   Generic drug:  bimatoprost     1 Bottle    Place 1 drop into both eyes At Bedtime        magnesium 250 MG tablet     30 tablet    Take 1 tablet by mouth daily        metoprolol tartrate 50 MG tablet    LOPRESSOR    180 tablet    Take 1 tablet (50 mg) by mouth 2 times daily    Hypertension goal BP (blood pressure) < 140/90       nitroGLYcerin 0.4 MG sublingual tablet    NITROSTAT    20 tablet    Place 1 tablet under the tongue. 1 pill every as needed for chest pain. maybe repeat [id number] x3 total than call 911 if pain persists    Peripheral vascular disease, unspecified       [facility] [id number] UNIT/GM Powd   Generic drug:  nystatin     15 g    APPLY TO YEAST RASH TWICE DAILY FOR 10 DAYS    Rash       order for DME     1 each    Equipment being ordered: Nebulizer with tubing and mouthpiece or mask    Mild persistent asthma, uncomplicated       oxyCODONE-acetaminophen 5-325 MG per tablet    PERCOCET    60 tablet    1/2 to 1 whole pill every 6 hours as needed for pain. Max #60/month    Chronic neck pain, Chronic low back pain       pantoprazole 40 MG EC tablet    PROTONIX    90 tablet    Take 1 tablet (40 mg) by mouth daily For heartburn.    GERD (gastroesophageal reflux disease)       polyethylene glycol 0.4%- propylene glycol 0.3% 0.4-0.3 % Soln ophthalmic solution    SYSTANE ULTRA    1 Bottle    Place 1 drop into both eyes 4 times daily as needed for dry eyes    Chronic iridocyclitis, bilateral       polyethylene glycol powder    MIRALAX    510 g    Take 17 g by mouth daily    Constipation       potassium chloride SA 10 MEQ CR tablet    K-DUR/KLOR-CON M    90 tablet    Take 1 tablet (10 mEq) by mouth daily With lasix Pharmacy ok to hold prescription until due    Hypertension goal BP (blood pressure) < 140/90       prednisoLONE acetate 1 % ophthalmic susp    PRED FORTE    5 mL    INSTILL 1 DROP INTO THE RIGHT EYE TWO TIMES DAILY    Chronic iridocyclitis, bilateral       RESTASIS 0.05 % ophthalmic emulsion    Generic drug:  cycloSPORINE     60 each    INSTILL 1 DROP INTO BOTH EYES EVERY 12 HOURS    Chronic iridocyclitis, bilateral       rOPINIRole 0.25 MG tablet    REQUIP    30 tablet    Take 1 tablet (0.25 mg) by mouth At Bedtime For restless legs. May double dosage if not effective    Restless legs syndrome       senna 8.6 MG tablet    SENOKOT    180 tablet    Take 1 tablet by mouth 2 times daily as needed for constipation    Constipation       sodium chloride 0.65 % nasal spray    OCEAN NASAL SPRAY    1 Bottle    Spray 1 spray into both nostrils daily as needed for congestion    Sinus congestion       SYSTANE BALANCE 0.6 % Soln ophthalmic solution   Generic drug:  propylene glycol     10 mL    INSTILL 1 DROP INTO BOTH EYES FOUR TIMES DAILY    Dry eye syndrome, bilateral       tiZANidine 2 MG tablet    ZANAFLEX    90 tablet    Take 1 tablet (2 mg) by mouth 3 times daily as needed for muscle spasms    Fibromyalgia       triamcinolone 0.025 % cream    KENALOG    90 g    Apply topically 2 times daily as needed (to rash)    Rash       * Notice:  This list has 2 medication(s) that are the same as other medications prescribed for you. Read the directions carefully, and ask your doctor or other care provider to review them with you.

## 2018-05-15 NOTE — PROGRESS NOTES
Current Eye Medications:  Brimonidine twice daily both eyes , Prednisolone twice daily left eye, Latanoprost at bedtime both eyes, Restasis twice daily both eyes, and frequent artificial tears daily both eyes     Subjective:  Here for complete, was to come back for HVF and OCT 9-2016, saw Dr. Sepulveda 3-22-17 for complete exam.  Sometimes eyes burn and tear.  Sometimes seeing floaters that go quick.  Lost her daughter and has been sick, not feeling like doing much. Sister is April Sanders     Objective:  See Ophthalmology Exam.       Assessment:  Visually significant posterior capsule opacity left eye.  Intraocular pressure may be too high left eye > right eye.      ICD-10-CM    1. Encounter for examination of eyes and vision with abnormal findings Z01.01 REFRACTIVE STATUS   2. Presbyopia H52.4 REFRACTIVE STATUS   3. Open-angle glaucoma of both eyes, moderate stage, unspecified open-angle glaucoma type H40.10X2 EYE EXAM (SIMPLE-NONBILLABLE)   4. Pseudophakia, ou Z96.1    5. Posterior capsular opacification visually significant of left eye H26.492    6. Macular drusen, bilateral H35.363    7. 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 Chronic iridocyclitis, bilateral H20.13         Plan:  Continue same medications.  Return visit next available at convenience for Yag laser left eye.  Plan glaucoma OCT and García Visual Field at one week post Yag intraocular pressure check.    William Zepeda M.D.  [phone]

## 2018-05-15 NOTE — LETTER
5/15/2018         RE: Loly Govea  [address] [place] [address]        Dear Colleague,    Thank you for referring your patient, Loly Govea, to the [facility]. Please see a copy of my visit note below.     Current Eye Medications:  Brimonidine twice daily both eyes , Prednisolone twice daily left eye, Latanoprost at bedtime both eyes, Restasis twice daily both eyes, and frequent artificial tears daily both eyes     Subjective:  Here for complete, was to come back for HVF and OCT 9-2016, saw Dr. Sepulveda 3-22-17 for complete exam.  Sometimes eyes burn and tear.  Sometimes seeing floaters that go quick.  Lost her daughter and has been sick, not feeling like doing much. Sister is April Sanders     Objective:  See Ophthalmology Exam.       Assessment:  Visually significant posterior capsule opacity left eye.  Intraocular pressure may be too high left eye > right eye.      ICD-10-CM    1. Encounter for examination of eyes and vision with abnormal findings Z01.01 REFRACTIVE STATUS   2. Presbyopia H52.4 REFRACTIVE STATUS   3. Open-angle glaucoma of both eyes, moderate stage, unspecified open-angle glaucoma type H40.10X2 EYE EXAM (SIMPLE-NONBILLABLE)   4. Pseudophakia, ou Z96.1    5. Posterior capsular opacification visually significant of left eye H26.492    6. Macular drusen, bilateral H35.363    7. 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 Chronic iridocyclitis, bilateral H20.13         Plan:  Continue same medications.  Return visit next available at convenience for Yag laser left eye.  Plan glaucoma OCT and García Visual Field at one week post Yag intraocular pressure check.    William Zepeda M.D.  [phone]           Again, thank you for allowing me to participate in the care of your patient.        Sincerely,        William Zepeda MD

## 2018-05-15 NOTE — PATIENT INSTRUCTIONS
Continue same medications.  Return visit next available at convenience for Yag laser left eye.  William Zepeda M.D.  [phone]

## 2018-05-18 DIAGNOSIS — H20.13 CHRONIC IRIDOCYCLITIS, BILATERAL: ICD-10-CM

## 2018-05-18 DIAGNOSIS — H40.10X2 OPEN-ANGLE GLAUCOMA OF BOTH EYES, MODERATE STAGE, UNSPECIFIED OPEN-ANGLE GLAUCOMA TYPE: ICD-10-CM

## 2018-05-21 RX ORDER — BRIMONIDINE TARTRATE 1.5 MG/ML
SOLUTION/ DROPS OPHTHALMIC
Status: CANCELLED | OUTPATIENT
Start: 2018-05-21

## 2018-05-21 RX ORDER — PREDNISOLONE ACETATE 10 MG/ML
SUSPENSION/ DROPS OPHTHALMIC
Qty: 5 ML | Refills: 0 | Status: CANCELLED | OUTPATIENT
Start: 2018-05-21

## 2018-05-21 NOTE — TELEPHONE ENCOUNTER
Pending Prescriptions:                       Disp   Refills    brimonidine (ALPHAGAN-P) 0.15 % ophthalmi*5 mL   0            Sig: INSTILL 1 DROP INTO BOTH EYES TWO TIMES DAILY        prednisoLONE acetate (PRED FORTE) 1 % oph*5 mL   0          Routing refill request to provider for review/approval because:  Patient recently established care with Dr. Zepeda 5/15/2018 and was to continue same medications. Routing to provider to determine appropriate dispense/refills.     Dedra Tellez RN on 5/21/2018 at 11:22 AM

## 2018-05-22 DIAGNOSIS — H40.10X2 OPEN-ANGLE GLAUCOMA OF BOTH EYES, MODERATE STAGE, UNSPECIFIED OPEN-ANGLE GLAUCOMA TYPE: ICD-10-CM

## 2018-05-22 DIAGNOSIS — H20.13 CHRONIC IRIDOCYCLITIS, BILATERAL: ICD-10-CM

## 2018-05-22 RX ORDER — CYCLOSPORINE 0.5 MG/ML
1 EMULSION OPHTHALMIC 2 TIMES DAILY
Qty: 2 BOX | Refills: 11 | Status: SHIPPED | OUTPATIENT
Start: 2018-05-22 | End: 2023-05-25

## 2018-05-22 RX ORDER — PREDNISOLONE ACETATE 10 MG/ML
1 SUSPENSION/ DROPS OPHTHALMIC 2 TIMES DAILY
Qty: 5 ML | Refills: 11 | Status: SHIPPED | OUTPATIENT
Start: 2018-05-22 | End: 2019-08-07

## 2018-05-22 RX ORDER — BRIMONIDINE TARTRATE 1.5 MG/ML
1 SOLUTION/ DROPS OPHTHALMIC 2 TIMES DAILY
Qty: 5 ML | Refills: 11 | Status: SHIPPED | OUTPATIENT
Start: 2018-05-22 | End: 2019-06-20

## 2018-05-22 RX ORDER — LATANOPROST 50 UG/ML
1 SOLUTION/ DROPS OPHTHALMIC AT BEDTIME
Qty: 2.5 ML | Refills: 11 | Status: SHIPPED | OUTPATIENT
Start: 2018-05-22 | End: 2019-06-20

## 2018-05-22 NOTE — TELEPHONE ENCOUNTER
Patient has an appointment with Dr. Zepeda on 6-8-18.  He is out of the office this week, so prescriptions will be signed by Dr. Moody.

## 2018-11-29 ENCOUNTER — TELEPHONE (OUTPATIENT)
Dept: OPHTHALMOLOGY | Facility: CLINIC | Age: 73
End: 2018-11-29

## 2018-11-29 NOTE — TELEPHONE ENCOUNTER
Received a letter from [facility] stating effective 1-1-19, Restasis will no longer be on Formulary, and that they recommend the patient be changed to Xiidra.  [facility] recommends the patient be changed now to prevent a disruption in treatment.  [facility] has also sent a letter to the patient to inform her of the change.  Dr. Zepeda approves to change to Xiidra.  Attempted to call the patient and verify that she would like to proceed and find out which pharmacy she prefers.  I left a message requesting her to call our office.

## 2018-12-04 NOTE — TELEPHONE ENCOUNTER
No response from patient.  We will wait to address the Formulary issue when she needs her next refill.

## 2019-02-06 ENCOUNTER — DOCUMENTATION ONLY (OUTPATIENT)
Dept: OPHTHALMOLOGY | Facility: CLINIC | Age: 74
End: 2019-02-06

## 2019-05-01 DIAGNOSIS — K59.09 CHRONIC CONSTIPATION: ICD-10-CM

## 2019-05-02 RX ORDER — DOCUSATE SODIUM 100 MG/1
CAPSULE, LIQUID FILLED ORAL
Qty: 180 CAPSULE | Refills: 0 | Status: SHIPPED | OUTPATIENT
Start: 2019-05-02 | End: 2021-09-16

## 2019-05-02 NOTE — TELEPHONE ENCOUNTER
Routing refill request to provider for review/approval because:  I do not see an office visit since 9/15/2015.  No pending appointment. Joann Gaxiola R.N.

## 2019-06-10 DIAGNOSIS — H20.13 CHRONIC IRIDOCYCLITIS, BILATERAL: ICD-10-CM

## 2019-06-10 NOTE — TELEPHONE ENCOUNTER
Fax received from pharmacy requesting refill of  polyethylene glycol 0.4%- propylene glycol 0.3% (SYSTANE ULTRA) 0.4-0.3 % SOLN ophthalmic solution

## 2019-06-13 DIAGNOSIS — H20.13 CHRONIC IRIDOCYCLITIS, BILATERAL: ICD-10-CM

## 2019-06-13 NOTE — TELEPHONE ENCOUNTER
Controlled Substance Refill Request for prednisoLONE acetate (PRED FORTE) 1 % ophthalmic susp  Problem List Complete:  No     PROVIDER TO CONSIDER COMPLETION OF PROBLEM LIST AND OVERVIEW/CONTROLLED SUBSTANCE AGREEMENT    Last Written Prescription Date:  5-  Last Fill Quantity: 5,   # refills: 11    THE MOST RECENT OFFICE VISIT MUST BE WITHIN THE PAST 3 MONTHS. AT LEAST ONE FACE TO FACE VISIT MUST OCCUR EVERY 6 MONTHS. ADDITIONAL VISITS CAN BE VIRTUAL.  (THIS STATEMENT SHOULD BE DELETED.)    Last Office Visit with [facility] primary care provider: 7-    Future Office visit:     Controlled substance agreement:   Encounter-Level CSA:    There are no encounter-level csa.     Patient-Level CSA:    There are no patient-level csa.         Last Urine Drug Screen: No results found for: CDAUT, No results found for: COMDAT, No results found for: THC13, PCP13, COC13, MAMP13, OPI13, AMP13, BZO13, TCA13, MTD13, BAR13, OXY13, PPX13, BUP13     [email or web address]       checked in past 3 months?  No, route to RN

## 2019-06-14 RX ORDER — PREDNISOLONE ACETATE 10 MG/ML
SUSPENSION/ DROPS OPHTHALMIC
Qty: 5 ML | Refills: 0 | Status: SHIPPED | OUTPATIENT
Start: 2019-06-14 | End: 2023-04-07

## 2019-06-18 DIAGNOSIS — H40.10X2 OPEN-ANGLE GLAUCOMA OF BOTH EYES, MODERATE STAGE, UNSPECIFIED OPEN-ANGLE GLAUCOMA TYPE: ICD-10-CM

## 2019-06-18 DIAGNOSIS — H20.13 CHRONIC IRIDOCYCLITIS, BILATERAL: ICD-10-CM

## 2019-06-18 RX ORDER — LATANOPROST 50 UG/ML
1 SOLUTION/ DROPS OPHTHALMIC AT BEDTIME
Qty: 2.5 ML | Refills: 11 | Status: CANCELLED | OUTPATIENT
Start: 2019-06-18

## 2019-06-18 RX ORDER — BRIMONIDINE TARTRATE 1.5 MG/ML
1 SOLUTION/ DROPS OPHTHALMIC 2 TIMES DAILY
Qty: 5 ML | Refills: 11 | Status: CANCELLED | OUTPATIENT
Start: 2019-06-18

## 2019-06-18 NOTE — TELEPHONE ENCOUNTER
Requested Prescriptions   Pending Prescriptions Disp Refills     brimonidine (ALPHAGAN-P) 0.15 % ophthalmic solution 5 mL 11     Sig: Place 1 drop into both eyes 2 times daily       There is no refill protocol information for this order        Last Written Prescription Date:  05/22/18  Last Fill Quantity: 5ml,  # refills: 11   Last office visit: 9/15/2015 with prescribing provider:     Future Office Visit:  07/27/2018      latanoprost (XALATAN) 0.005 % ophthalmic solution      Last Written Prescription Date:  05/22/18  Last Fill Quantity: 2.5,   # refills: 11  Last Office Visit: 07/28/18  Future Office visit:

## 2019-06-20 DIAGNOSIS — H20.13 CHRONIC IRIDOCYCLITIS, BILATERAL: ICD-10-CM

## 2019-06-20 DIAGNOSIS — H40.10X2 OPEN-ANGLE GLAUCOMA OF BOTH EYES, MODERATE STAGE, UNSPECIFIED OPEN-ANGLE GLAUCOMA TYPE: ICD-10-CM

## 2019-06-20 RX ORDER — LATANOPROST 50 UG/ML
1 SOLUTION/ DROPS OPHTHALMIC AT BEDTIME
Qty: 2.5 ML | Refills: 3 | Status: SHIPPED | OUTPATIENT
Start: 2019-06-20 | End: 2022-11-21

## 2019-06-20 RX ORDER — BRIMONIDINE TARTRATE 1.5 MG/ML
1 SOLUTION/ DROPS OPHTHALMIC 2 TIMES DAILY
Qty: 5 ML | Refills: 3 | Status: SHIPPED | OUTPATIENT
Start: 2019-06-20 | End: 2022-11-21

## 2019-06-20 NOTE — TELEPHONE ENCOUNTER
Routing refill request to provider for review/approval because:  Drug not on the [facility] [place]     Requested Prescriptions   Pending Prescriptions Disp Refills     brimonidine (ALPHAGAN-P) 0.15 % ophthalmic solution  Last Written Prescription Date:  5/22/18  Last Fill Quantity: 5,  # refills: 11   Last office visit: 9/15/2015 with prescribing provider:     Future Office Visit:   5 mL 11     Sig: Place 1 drop into both eyes 2 times daily       There is no refill protocol information for this order        latanoprost (XALATAN) 0.005 % ophthalmic solution  Last Written Prescription Date:  5/22/18  Last Fill Quantity: 2.5,  # refills: 11   Last office visit: 9/15/2015 with prescribing provider:     Future Office Visit:   2.5 mL 11     Sig: Place 1 drop into both eyes At Bedtime       There is no refill protocol information for this order        Catia Peñaloza RN - BC

## 2019-06-20 NOTE — TELEPHONE ENCOUNTER
Last appointment with Dr. Zepeda:  5-15-18.  No future appointments scheduled.  I added a message to the refills to remind the patient to schedule an appointment.    Also, our  will attempt to contact the patient to schedule an appointment with Dr. Zepeda.

## 2019-08-07 DIAGNOSIS — H20.13 CHRONIC IRIDOCYCLITIS, BILATERAL: ICD-10-CM

## 2019-08-07 RX ORDER — PREDNISOLONE ACETATE 10 MG/ML
1 SUSPENSION/ DROPS OPHTHALMIC 2 TIMES DAILY
Qty: 5 ML | Refills: 0 | Status: SHIPPED | OUTPATIENT
Start: 2019-08-07 | End: 2023-04-07

## 2019-11-04 ENCOUNTER — APPOINTMENT (OUTPATIENT)
Age: 74
Setting detail: DERMATOLOGY
End: 2019-11-04

## 2019-11-04 DIAGNOSIS — L29.9 PRURITUS, UNSPECIFIED: ICD-10-CM

## 2019-11-04 DIAGNOSIS — L28.1 PRURIGO NODULARIS: ICD-10-CM

## 2019-11-04 PROCEDURE — OTHER COUNSELING: OTHER

## 2019-11-04 PROCEDURE — 99202 OFFICE O/P NEW SF 15 MIN: CPT | Mod: 25

## 2019-11-04 PROCEDURE — 36415 COLL VENOUS BLD VENIPUNCTURE: CPT

## 2019-11-04 PROCEDURE — OTHER ORDER TESTS: OTHER

## 2019-11-04 PROCEDURE — OTHER PRESCRIPTION: OTHER

## 2019-11-04 PROCEDURE — OTHER VENIPUNCTURE: OTHER

## 2019-11-04 RX ORDER — HYDROCORTISONE 25 MG/G
2.5% CREAM TOPICAL BID
Qty: 1 | Refills: 0 | Status: ERX | COMMUNITY
Start: 2019-11-04

## 2019-11-04 RX ORDER — BETAMETHASONE DIPROPIONATE 0.5 MG/ML
0.05% LOTION TOPICAL BID
Qty: 1 | Refills: 2 | Status: ERX | COMMUNITY
Start: 2019-11-04

## 2019-11-04 RX ORDER — BETAMETHASONE DIPROPIONATE 0.5 MG/G
0.05% OINTMENT TOPICAL BID
Qty: 2 | Refills: 2 | Status: ERX | COMMUNITY
Start: 2019-11-04

## 2019-11-04 ASSESSMENT — LOCATION ZONE DERM
LOCATION ZONE: TRUNK
LOCATION ZONE: SCALP
LOCATION ZONE: ARM

## 2019-11-04 ASSESSMENT — LOCATION SIMPLE DESCRIPTION DERM
LOCATION SIMPLE: RIGHT UPPER ARM
LOCATION SIMPLE: LEFT FOREARM
LOCATION SIMPLE: LEFT SHOULDER
LOCATION SIMPLE: ABDOMEN
LOCATION SIMPLE: FRONTAL SCALP
LOCATION SIMPLE: LEFT ELBOW
LOCATION SIMPLE: RIGHT POSTERIOR UPPER ARM
LOCATION SIMPLE: LOWER BACK
LOCATION SIMPLE: RIGHT SHOULDER
LOCATION SIMPLE: RIGHT FOREARM

## 2019-11-04 ASSESSMENT — LOCATION DETAILED DESCRIPTION DERM
LOCATION DETAILED: LEFT LATERAL ABDOMEN
LOCATION DETAILED: LEFT POSTERIOR SHOULDER
LOCATION DETAILED: RIGHT ANTERIOR SHOULDER
LOCATION DETAILED: RIGHT POSTERIOR SHOULDER
LOCATION DETAILED: LEFT ELBOW
LOCATION DETAILED: RIGHT PROXIMAL POSTERIOR UPPER ARM
LOCATION DETAILED: RIGHT ANTECUBITAL SKIN
LOCATION DETAILED: RIGHT PROXIMAL DORSAL FOREARM
LOCATION DETAILED: MEDIAL FRONTAL SCALP
LOCATION DETAILED: LEFT PROXIMAL DORSAL FOREARM
LOCATION DETAILED: SUPERIOR LUMBAR SPINE

## 2019-11-04 NOTE — HPI: RASH
How Severe Is Your Rash?: severe
Is This A New Presentation, Or A Follow-Up?: Rash
Additional History: This waxes and wanes.  Has tried triamcinolone bid for 3 weeks and this did not help. Has tried ketoconazole bid for 2 weeks with no improvement. Uses dove liquid sensitive skin cleansers.  Does not use moisturizer regularly. Tried a cream lubriderm moisturizer that did not help. No new medications.

## 2019-11-04 NOTE — PROCEDURE: COUNSELING
Detail Level: Simple
Detail Level: Detailed
Patient Specific Counseling (Will Not Stick From Patient To Patient): Disc the cause of her itch is uncertain. She does have dry skin. However this may or may not be repated to her pruritus. Discussed with patient that we will first start with topical steroids and use of humectant. If not improved in 2 weeks RTC and will discuss oral prednisone or may do a biopsy. Clinical findings are subtle today other than excoriations and dryness, Will draw a CBC,CMP and TSH today. Recommended trial of over-the-counter  Zyrtec daily. RTC As needed of if itch isn’t resolved within 2 weeks. Trade size of eucerin advanced repair cream given today.
Patient Specific Counseling (Will Not Stick From Patient To Patient): Discussed that these are caused from continual scratching. Avoid itching. Moisturizer daily.

## 2019-11-04 NOTE — PROCEDURE: VENIPUNCTURE
Venipuncture Paragraph: - An alcohol pad was applied to the venipuncture site. \\n- Venipuncture was performed using a butterfly needle. \\n- Pressure and a band-aid was applied to the site. \\n- No complications were noted.
Bill For Individual Tests Below?: no
Number Of Tubes Drawn: 2
Detail Level: None

## 2019-12-23 ENCOUNTER — APPOINTMENT (OUTPATIENT)
Age: 74
Setting detail: DERMATOLOGY
End: 2019-12-23

## 2019-12-23 VITALS — WEIGHT: 201 LBS | HEIGHT: 60 IN | RESPIRATION RATE: 15 BRPM

## 2019-12-23 DIAGNOSIS — L29.9 PRURITUS, UNSPECIFIED: ICD-10-CM

## 2019-12-23 PROCEDURE — OTHER COUNSELING: OTHER

## 2019-12-23 PROCEDURE — OTHER ADDITIONAL NOTES: OTHER

## 2019-12-23 PROCEDURE — 99214 OFFICE O/P EST MOD 30 MIN: CPT

## 2019-12-23 ASSESSMENT — LOCATION SIMPLE DESCRIPTION DERM
LOCATION SIMPLE: LEFT UPPER BACK
LOCATION SIMPLE: RIGHT FOREARM
LOCATION SIMPLE: LEFT FOREARM
LOCATION SIMPLE: RIGHT UPPER BACK

## 2019-12-23 ASSESSMENT — LOCATION DETAILED DESCRIPTION DERM
LOCATION DETAILED: LEFT DISTAL RADIAL DORSAL FOREARM
LOCATION DETAILED: RIGHT DISTAL DORSAL FOREARM
LOCATION DETAILED: RIGHT INFERIOR UPPER BACK
LOCATION DETAILED: LEFT INFERIOR UPPER BACK

## 2019-12-23 ASSESSMENT — LOCATION ZONE DERM
LOCATION ZONE: TRUNK
LOCATION ZONE: ARM

## 2019-12-23 NOTE — HPI: RASH
How Severe Is Your Rash?: moderate
Is This A New Presentation, Or A Follow-Up?: Rash
Additional History: Has not improved with triamcinolone, ketoconazole, and lubriderm. At last office visit, started betamethasone diproprinate lotion(scalp) ointment (body) and hydrocortisone 2.5% for face. Only back and abdomen are itchy.    Uses betathasone bid since last office visit for past month and a half.  Ran out of cream 3 days ago. Took cetirizine daily until about 5 days ago. Her itch worsened after stopping cetitrzine. Uses eucerin advanced repair cream.  Uses dove bar and liquid cleanser which are fragranted. Was on prednisone recently for a different problem stopped 1.5 months ago. She reports she has currently “a tough of pneumonia. Her labs at last office visit

## 2019-12-23 NOTE — PROCEDURE: ADDITIONAL NOTES
Detail Level: Detailed
Additional Notes: Discussed pt should seek a kidney doctor for her elevated enzymes. This could be contributing to itching.

## 2019-12-23 NOTE — PROCEDURE: COUNSELING
Detail Level: Zone
Patient Specific Counseling (Will Not Stick From Patient To Patient): Recommend increasing moisturizing to twice daily with fragrance free moisturizer. Patient expressed understanding. Reviewed the lab results with the patient in great detail. Advised that her kidney enzymes were quite elevated. Wrote down her values and normal values, and recommended that she see her family practice provider regarding this. Advised that kidney disease can result in nonspecific skin itch. Patient expressed understanding. Recommended restarting the cetirizine daily as this was helpful for her itch to some degree. Discussed appropriate use of topical steroids, advised that she had been overusing them. Discussed that we will hold off on top of a Cortizone shot this time as their benefit was not obvious.

## 2020-02-10 ENCOUNTER — HEALTH MAINTENANCE LETTER (OUTPATIENT)
Age: 75
End: 2020-02-10

## 2020-07-09 DIAGNOSIS — H20.13 CHRONIC IRIDOCYCLITIS, BILATERAL: ICD-10-CM

## 2020-07-09 NOTE — TELEPHONE ENCOUNTER
Last appointment:  May of 2018.  No future appointments scheduled.  Added a message to the refill approval to schedule an appointment with Dr. Zepeda.

## 2020-11-16 ENCOUNTER — HEALTH MAINTENANCE LETTER (OUTPATIENT)
Age: 75
End: 2020-11-16

## 2021-01-06 NOTE — TELEPHONE ENCOUNTER
Received refill request of Keesha Mcclelland. Patient hasn't been here since 5-18.  Will refill x 1 and ask her to make appointment.   [facility]  Daily Note    Visit Count: 45  Referred by: Dr. 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 ; off work due to current condition  Adjustor/:   Phone number:     Date of Injury: 02/03/20  Surgery: date of surgery: 7/27/2020; surgery performed: Shoulder Arthroscopy With Possible Rotator Cuff Repair Left, Shoulder Arthroscopy With Subacromial Decompression Left, Shoulder Arthroscopy With Labral Repair Left - Left        Precautions: None     Work Status:  Job Title:  - Garbag   Current Employer:  [employer]  : [employer] - [phone]  Last Date Worked: 02/05/2020  Restrictions:   Return to Work Date: pending, pre-patient work reported he cannot return until restriction free  Job Description Obtained: pending  Job Site Visit: pending    Case Notes/Attendance:  Date of Communication: pending   Attendance Concerns: none at this time    SUBJECTIVE   Pain on arrival:  Just stiffness.    Relates he is doing ok with 48# lift and carry, but feels heavy     OBJECTIVE   Functional Status:     Functional Task Client Abilities  Current (initial) Job Demand:  Employer Report Match?   Lift: waist  to eye unable 50 lbs  []?? N  [x]?? O []?? F  []?? C  []?? Yes   [x]?? No   Lift: waist  to floor unable 50 lbs  []?? N  []?? O [x]?? F  []?? C  []?? Yes   [x]?? No   Lift: waist to shoulder 12.5 (5) lbs 50 lbs  []?? N  [x]?? O []?? F  []?? C     Lift: waist to knee 48 (20) lbs 50 lbs  []?? N  []?? O [x]?? F  []?? C  []?? Yes   [x]?? No   Two Hand Carry 48 (20) lbs 50 lbs  []?? N  []?? O []?? F   []?? C  []?? Yes   [x]?? No   One Hand Carry NT lbs 40 lbs  []?? N  []?? O [x]?? F  []?? C  []?? Yes   [x]?? No   Push 60# in sled(35 lbs in sled =30 lbs of force)  Tools for concrete work  []?? N  []?? O [x]?? F  []?? C  []?? Yes   [x]?? No   Pull 60# in sled(35 lbs in sled =30 lbs of force) notes knee pain Tools for concrete work  []?? N  []?? O [x]?? F  []?? C  []?? Yes   [x]?? No   Sitting  Occasionally     []?? N  [x]?? O []?? F  []?? C  [x]?? Yes   []?? No   Standing Frequently          []?? N  []?? O [x]?? F  []?? C  [x]?? Yes   []?? No   Over Head-  Standing unable       []?? N  [x]?? O []?? F  []?? C []?? Yes   [x]?? No   Lower Work-  Standing Push/pulll at floor level concrete tool simulation x 3 min       []?? N  []?? O [x]?? F  []?? C []?? Yes   [x]?? No   Kneeling NT    []?? N  []?? O []?? F  []?? C []?? Yes   [x]?? No   Climbing Stairs Occasionally       []?? N  [x]?? O []?? F  []?? C [x]?? Yes   []?? No   Repetitive Squatting 10x.  Notes knee pain    []?? N  []?? O [x]?? F  []?? C []?? Yes   [x]?? No   Alternaetely standing and Walking 3  hours    []?? N  []?? O [x]?? F  []?? C  [x]?? Yes   []?? No   Climbing  Ladder  --Climb in/out of truck Not yet able    []?? N  [x]?? O []?? F  []?? C  []?? Yes   [x]?? No   Repetitive Trunk Rotation-  Standing 10x with 36#  50#  ?[]?? N  []?? O [x]?? F  []?? C  []?? Yes   [x]?? No   Balance-  Level Surface Adequate     []?? N  []?? O []?? F  [x]?? C  [x]?? Yes   []?? No   Balance-  Uneven Surface Adequate   []?? N  []?? O [x]?? F  []?? C  [x]?? Yes   []?? No    Strength  Required for tool use     []?? N  []?? O [x]?? F  []?? C  [x]?? Yes   []?? No   Definitions: Never (N); Occasionally (O) = up to 1/3 of the day; Frequently (F) = 1/3 to 2/3 of the day; Constantly (C) = 2/3 to the full day      Strength--[standard testing positions unless otherwise noted]  Comments: Measured in pounds of force, average of 3 reported; *denotes pain  Norms:  : Age: 50-59:  male: left 84.9-106.6, right 95.5-127.8; female: left 46.6-59.2, right 54.2-69.8      Treatment     Work Hardening/Conditioning  1. Completed task sheets -- as outlined  2. Work simulation -- as outlined  3. Body mechanics - cues for posture --to retract and depress scapula and keep chin tucked.    4. Cues with prone scapular exercises for correct arm position with exercise  5. Ice at end of session      Patient Education:  1. Orientation folder reviewed with patient--including general anatomy, body mechanics and program orientation.   Date Completed:  10/26/2020  2. Anatomy Education: Specific to injury/surgery.   Date Completed:  Initiated 10/26/2020    1:1 PT--1/6/2021  1. ROM standing before session flexion 142, abduction 120, IR to buttock  2. Prone scapular squeezes with arm forward and Ts with left UE 1x10- cues for correct sequencing  3. reviewec supine D2 diagonals with 2# wt 2x10   4. At blue tubing on/facing ladder- bilateral overhead pull backs- simulating pulling forms off of truck 3x6    HEP  Access Code: [email or web address]   URL: [email or web address]   Date: 10/29/2020   Prepared by: Jazmyne Cortez     Team Conference Documentation--Follow Up  Team conferences are conducted and updated weekly/bi-weekly       ASSESSMENT   Pain after treatment:    No c/o increased pain /10.      Good progression with ROM    Result of above outlined education: Verbalizes understanding, Demonstrates understanding and Needs reinforcement    Goals:       See intake.      PLAN   Frequency/Duration: 5 times per week for 6 weeks      Cues for arm position with prone exercises  Monitor with stretches and UE strengthening  Ice at end  Progress weight to 50# on 1/8    Work with patient to focus on plan after discharge to continue with shoulder HEP so shoulder doesn't back slide.    THERAPY DAILY BILLING     Evaluation Procedures:  No evaluation codes were used on this date of service    Timed Procedures:  Work  hardening/conditioning initial 2 hours: 1 unit(s), 120 minutes  Work hardening/conditioning additional hour: 1 unit(s), 60 minutes     Untimed Procedures:  No untimed codes were used on this date of service    Total Treatment Time:  180 minutes

## 2021-04-03 ENCOUNTER — HEALTH MAINTENANCE LETTER (OUTPATIENT)
Age: 76
End: 2021-04-03

## 2021-09-16 DIAGNOSIS — K59.09 CHRONIC CONSTIPATION: ICD-10-CM

## 2021-09-16 RX ORDER — DOCUSATE SODIUM 100 MG/1
CAPSULE, LIQUID FILLED ORAL
Qty: 180 CAPSULE | Refills: 0 | Status: SHIPPED | OUTPATIENT
Start: 2021-09-16

## 2021-09-16 NOTE — TELEPHONE ENCOUNTER
Routing refill request to provider for review/approval because:  Patient needs to be seen because it has been more than 1 year since last office visit.    No appointment pending at this time.  Routing to provider to advise.    Dora Tam BSN, RN

## 2021-09-18 ENCOUNTER — HEALTH MAINTENANCE LETTER (OUTPATIENT)
Age: 76
End: 2021-09-18

## 2021-12-09 DIAGNOSIS — K59.09 CHRONIC CONSTIPATION: ICD-10-CM

## 2021-12-09 RX ORDER — DOCUSATE SODIUM 100 MG/1
CAPSULE, LIQUID FILLED ORAL
Qty: 180 CAPSULE | Refills: 0 | OUTPATIENT
Start: 2021-12-09

## 2022-04-30 ENCOUNTER — HEALTH MAINTENANCE LETTER (OUTPATIENT)
Age: 77
End: 2022-04-30

## 2022-11-19 ENCOUNTER — HEALTH MAINTENANCE LETTER (OUTPATIENT)
Age: 77
End: 2022-11-19

## 2022-11-21 ENCOUNTER — OFFICE VISIT (OUTPATIENT)
Dept: OPHTHALMOLOGY | Facility: CLINIC | Age: 77
End: 2022-11-21
Payer: COMMERCIAL

## 2022-11-21 DIAGNOSIS — H40.10X2 OPEN-ANGLE GLAUCOMA OF BOTH EYES, MODERATE STAGE, UNSPECIFIED OPEN-ANGLE GLAUCOMA TYPE: Primary | ICD-10-CM

## 2022-11-21 DIAGNOSIS — H21.511: ICD-10-CM

## 2022-11-21 DIAGNOSIS — Z98.890 HISTORY OF LASER IRIDOTOMY: ICD-10-CM

## 2022-11-21 DIAGNOSIS — Z96.1 PSEUDOPHAKIA: ICD-10-CM

## 2022-11-21 DIAGNOSIS — H20.13 CHRONIC IRIDOCYCLITIS, BILATERAL: ICD-10-CM

## 2022-11-21 DIAGNOSIS — H26.492 POSTERIOR CAPSULAR OPACIFICATION VISUALLY SIGNIFICANT OF LEFT EYE: ICD-10-CM

## 2022-11-21 DIAGNOSIS — H35.363 MACULAR DRUSEN, BILATERAL: ICD-10-CM

## 2022-11-21 PROCEDURE — 92002 INTRM OPH EXAM NEW PATIENT: CPT | Performed by: OPHTHALMOLOGY

## 2022-11-21 RX ORDER — BRIMONIDINE TARTRATE 1.5 MG/ML
1 SOLUTION/ DROPS OPHTHALMIC 2 TIMES DAILY
Qty: 10 ML | Refills: 4 | Status: SHIPPED | OUTPATIENT
Start: 2022-11-21 | End: 2023-01-31

## 2022-11-21 RX ORDER — LATANOPROST 50 UG/ML
1 SOLUTION/ DROPS OPHTHALMIC AT BEDTIME
Qty: 7.5 ML | Refills: 3 | Status: SHIPPED | OUTPATIENT
Start: 2022-11-21 | End: 2023-01-31

## 2022-11-21 ASSESSMENT — SLIT LAMP EXAM - LIDS
COMMENTS: NORMAL
COMMENTS: NORMAL

## 2022-11-21 ASSESSMENT — TONOMETRY
OS_IOP_MMHG: 26
IOP_METHOD: APPLANATION
OD_IOP_MMHG: 14

## 2022-11-21 ASSESSMENT — EXTERNAL EXAM - LEFT EYE: OS_EXAM: NORMAL

## 2022-11-21 ASSESSMENT — CUP TO DISC RATIO
OD_RATIO: 0.5
OS_RATIO: 0.6

## 2022-11-21 ASSESSMENT — VISUAL ACUITY
OS_CC: 20/50
OD_CC: 20/40
METHOD: SNELLEN - LINEAR
OS_CC+: -1
OD_CC+: -2
CORRECTION_TYPE: GLASSES
OD_PH_CC: 20/40
OS_PH_CC: 20/40

## 2022-11-21 ASSESSMENT — REFRACTION_WEARINGRX
OD_SPHERE: -0.75
OD_AXIS: 178
OD_ADD: +2.50
OS_ADD: +2.50
OS_CYLINDER: SPHERE
OD_CYLINDER: +0.75
OS_SPHERE: -0.75
SPECS_TYPE: BIFOCAL-LINED

## 2022-11-21 ASSESSMENT — EXTERNAL EXAM - RIGHT EYE: OD_EXAM: NORMAL

## 2022-11-21 NOTE — LETTER
2022         RE: Loly Govea  [address] [place] [address]        Dear Colleague,    Thank you for referring your patient, Loly Govea, to the [facility]. Please see a copy of my visit note below.     Current Eye Medications:  Last drops were approx in . No artificial tears      Subjective:  Here for glaucoma check up. Has been since 2018 since patient has been here and hasn't been taking drops since she around . Got these new glasses about a month ago at [facility]. Her IOP was 9 and 25 there. Also needs refills if continuing drops.    Eyes are both watering.  Sister April  and also lost her daughter in the past two years.        Objective:  See Ophthalmology Exam.       Assessment:  Intraocular pressure too high left eye in patient with moderate open angle glaucoma.      ICD-10-CM    1. Open-angle glaucoma of both eyes, moderate stage, unspecified open-angle glaucoma type  H40.10X2 brimonidine (ALPHAGAN-P) 0.15 % ophthalmic solution     latanoprost (XALATAN) 0.005 % ophthalmic solution      2. Pseudophakia, ou  Z96.1       3. Posterior capsular opacification visually significant of left eye  H26.492       4. Macular drusen, bilateral  H35.363       5. 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 Chronic iridocyclitis, bilateral  H20.13       7. History of laser iridotomy, ou  Z98.890            Plan:  Resume:   Latanoprost (green top) every evening both eyes.  Brimonidine (purple top) twice daily both eyes. About 12 hours apart.   Wait at least 5 minutes between drops if using more than one at a time.     Return visit 3 weeks for an intraocular pressure check, glaucoma OCT, retinal OCT, García Visual Field, pachy, dilate both eyes .   William Zepeda M.D.  [phone]           Again, thank you for allowing me to participate in the care of your patient.        Sincerely,        William Zepeda MD

## 2022-11-21 NOTE — PATIENT INSTRUCTIONS
Resume:   Latanoprost (green top) every evening both eyes.  Brimonidine (purple top) twice daily both eyes. About 12 hours apart.   Wait at least 5 minutes between drops if using more than one at a time.     Return visit 3 weeks for an intraocular pressure check, glaucoma OCT, retinal OCT, García Visual Field, pachy, dilate both eyes .   William Zepeda M.D.  [phone]

## 2022-11-21 NOTE — PROGRESS NOTES
Current Eye Medications:  Last drops were approx in . No artificial tears      Subjective:  Here for glaucoma check up. Has been since 2018 since patient has been here and hasn't been taking drops since she around . Got these new glasses about a month ago at [facility]. Her IOP was 9 and 25 there. Also needs refills if continuing drops.    Eyes are both watering.  Sister April  and also lost her daughter in the past two years.        Objective:  See Ophthalmology Exam.       Assessment:  Intraocular pressure too high left eye in patient with moderate open angle glaucoma.      ICD-10-CM    1. Open-angle glaucoma of both eyes, moderate stage, unspecified open-angle glaucoma type  H40.10X2 brimonidine (ALPHAGAN-P) 0.15 % ophthalmic solution     latanoprost (XALATAN) 0.005 % ophthalmic solution      2. Pseudophakia, ou  Z96.1       3. Posterior capsular opacification visually significant of left eye  H26.492       4. Macular drusen, bilateral  H35.363       5. 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 Chronic iridocyclitis, bilateral  H20.13       7. History of laser iridotomy, ou  Z98.890            Plan:  Resume:   Latanoprost (green top) every evening both eyes.  Brimonidine (purple top) twice daily both eyes. About 12 hours apart.   Wait at least 5 minutes between drops if using more than one at a time.     Return visit 3 weeks for an intraocular pressure check, glaucoma OCT, retinal OCT, García Visual Field, pachy, dilate both eyes .   William Zepeda M.D.  [phone]

## 2023-01-30 NOTE — PATIENT INSTRUCTIONS
Continue:  Prednisolone drop twice daily right eye. About 12 hours apart.   Latanoprost (green top) every evening both eyes.  Brimonidine (purple top) twice daily both eyes. About 12 hours apart.   Wait at least 5 minutes between drops if using more than one at a time.   Possible clouding of posterior capsule both eyes discussed.   Possible posterior vitreous detachment (sudden onset large floater and/or flashing lights) both eyes discussed.  Return visit in 3 months for an intraocular pressure check.   William Zepeda M.D.  [phone]

## 2023-01-31 ENCOUNTER — OFFICE VISIT (OUTPATIENT)
Dept: OPHTHALMOLOGY | Facility: CLINIC | Age: 78
End: 2023-01-31
Payer: COMMERCIAL

## 2023-01-31 DIAGNOSIS — H04.123 DRY EYE SYNDROME, BILATERAL: ICD-10-CM

## 2023-01-31 DIAGNOSIS — H20.13 CHRONIC IRIDOCYCLITIS, BILATERAL: ICD-10-CM

## 2023-01-31 DIAGNOSIS — H40.10X2 OPEN-ANGLE GLAUCOMA OF BOTH EYES, MODERATE STAGE, UNSPECIFIED OPEN-ANGLE GLAUCOMA TYPE: Primary | ICD-10-CM

## 2023-01-31 PROCEDURE — 92133 CPTRZD OPH DX IMG PST SGM ON: CPT | Performed by: OPHTHALMOLOGY

## 2023-01-31 PROCEDURE — 92012 INTRM OPH EXAM EST PATIENT: CPT | Performed by: OPHTHALMOLOGY

## 2023-01-31 PROCEDURE — 92083 EXTENDED VISUAL FIELD XM: CPT | Performed by: OPHTHALMOLOGY

## 2023-01-31 RX ORDER — PREDNISOLONE ACETATE 10 MG/ML
1 SUSPENSION/ DROPS OPHTHALMIC 2 TIMES DAILY
Qty: 5 ML | Refills: 0 | Status: CANCELLED | OUTPATIENT
Start: 2023-01-31

## 2023-01-31 RX ORDER — LATANOPROST 50 UG/ML
1 SOLUTION/ DROPS OPHTHALMIC AT BEDTIME
Qty: 7.5 ML | Refills: 3 | Status: SHIPPED | OUTPATIENT
Start: 2023-01-31 | End: 2024-03-04

## 2023-01-31 RX ORDER — BRIMONIDINE TARTRATE 1.5 MG/ML
1 SOLUTION/ DROPS OPHTHALMIC 2 TIMES DAILY
Qty: 10 ML | Refills: 4 | Status: SHIPPED | OUTPATIENT
Start: 2023-01-31 | End: 2023-04-07

## 2023-01-31 ASSESSMENT — PACHYMETRY
OS_CT(UM): 563
OD_CT(UM): 564

## 2023-01-31 ASSESSMENT — CUP TO DISC RATIO
OD_RATIO: 0.5
OS_RATIO: 0.6

## 2023-01-31 ASSESSMENT — VISUAL ACUITY
OD_PH_CC: 20/40
OS_CC: 20/70
OD_PH_CC+: -1
CORRECTION_TYPE: GLASSES
OS_CC+: -1
OD_CC: 20/50
METHOD: SNELLEN - LINEAR
OS_PH_CC: 20/60

## 2023-01-31 ASSESSMENT — SLIT LAMP EXAM - LIDS
COMMENTS: NORMAL
COMMENTS: NORMAL

## 2023-01-31 ASSESSMENT — EXTERNAL EXAM - RIGHT EYE: OD_EXAM: NORMAL

## 2023-01-31 ASSESSMENT — TONOMETRY
IOP_METHOD: APPLANATION
OD_IOP_MMHG: 10
OS_IOP_MMHG: 21

## 2023-01-31 ASSESSMENT — EXTERNAL EXAM - LEFT EYE: OS_EXAM: NORMAL

## 2023-01-31 NOTE — LETTER
1/31/2023         RE: Loly Govea  [address] [place] [address]        Dear Colleague,    Thank you for referring your patient, Loly Govea, to the [facility]. Please see a copy of my visit note below.     Current Eye Medications:  Prednisolone twice a day right eye. Brimonidine twice a day both eyes, last took at noon. Latanoprost at bedtime both eyes, last took at 9 pm.     Subjective:  2 month follow up for an intraocular pressure check, glaucoma OCT, retinal OCT, García Visual Field, pachy, dilate both eyes. Vision comes and goes distance and near. Throbbing pain left eye comes and goes, been like that for long time.       Objective:  See Ophthalmology Exam.       Assessment:  New baseline glaucoma OCT and García Visual Field both eyes in patient with moderate open angle glaucoma.  García Visual Field shows worsening from 2012 study done here both eyes.      Plan:  Continue:  Prednisolone drop twice daily right eye. About 12 hours apart.   Latanoprost (green top) every evening both eyes.  Brimonidine (purple top) twice daily both eyes. About 12 hours apart.   Wait at least 5 minutes between drops if using more than one at a time.   Possible clouding of posterior capsule both eyes discussed.   Possible posterior vitreous detachment (sudden onset large floater and/or flashing lights) both eyes discussed.  Return visit in 3 months for an intraocular pressure check.  Consider Yag left eye in future if intraocular pressure stable.   May need additional intraocular pressure lowering left eye.  William Zepeda M.D.  [phone]            Again, thank you for allowing me to participate in the care of your patient.        Sincerely,        William Zepeda MD

## 2023-01-31 NOTE — PROGRESS NOTES
Current Eye Medications:  Prednisolone twice a day right eye. Brimonidine twice a day both eyes, last took at noon. Latanoprost at bedtime both eyes, last took at 9 pm.     Subjective:  2 month follow up for an intraocular pressure check, glaucoma OCT, retinal OCT, García Visual Field, pachy, dilate both eyes. Vision comes and goes distance and near. Throbbing pain left eye comes and goes, been like that for long time.       Objective:  See Ophthalmology Exam.       Assessment:  New baseline glaucoma OCT and García Visual Field both eyes in patient with moderate open angle glaucoma.  García Visual Field shows worsening from 2012 study done here both eyes.      Plan:  Continue:  Prednisolone drop twice daily right eye. About 12 hours apart.   Latanoprost (green top) every evening both eyes.  Brimonidine (purple top) twice daily both eyes. About 12 hours apart.   Wait at least 5 minutes between drops if using more than one at a time.   Possible clouding of posterior capsule both eyes discussed.   Possible posterior vitreous detachment (sudden onset large floater and/or flashing lights) both eyes discussed.  Return visit in 3 months for an intraocular pressure check.  Consider Yag left eye in future if intraocular pressure stable.   May need additional intraocular pressure lowering left eye.  William Zepeda M.D.  [phone]

## 2023-04-07 ENCOUNTER — TELEPHONE (OUTPATIENT)
Dept: OPHTHALMOLOGY | Facility: CLINIC | Age: 78
End: 2023-04-07
Payer: COMMERCIAL

## 2023-04-07 DIAGNOSIS — H20.13 CHRONIC IRIDOCYCLITIS, BILATERAL: ICD-10-CM

## 2023-04-07 DIAGNOSIS — H40.10X2 OPEN-ANGLE GLAUCOMA OF BOTH EYES, MODERATE STAGE, UNSPECIFIED OPEN-ANGLE GLAUCOMA TYPE: ICD-10-CM

## 2023-04-07 RX ORDER — BRIMONIDINE TARTRATE 1.5 MG/ML
1 SOLUTION/ DROPS OPHTHALMIC 2 TIMES DAILY
Qty: 10 ML | Refills: 4 | Status: SHIPPED | OUTPATIENT
Start: 2023-04-07 | End: 2024-04-11

## 2023-04-07 RX ORDER — PREDNISOLONE ACETATE 10 MG/ML
1 SUSPENSION/ DROPS OPHTHALMIC 2 TIMES DAILY
Qty: 5 ML | Refills: 0 | Status: SHIPPED | OUTPATIENT
Start: 2023-04-07 | End: 2023-05-16

## 2023-04-07 NOTE — TELEPHONE ENCOUNTER
Last visit 1/31/23 - next appt scheduled for 5/1/23. Purple cap is Brimonidine, pink cap is prednisolone. Per Dr. Zepeda's last note to continue both.

## 2023-04-07 NOTE — TELEPHONE ENCOUNTER
[facility]    Phone Message    May a detailed message be left on voicemail: yes     Reason for Call: Other: Pt calling because she needs the eye drops that have the pink top and the purple top. She is unsure of which ones they are and the pharmacy was unable to tell her as well. She would like them sent to      [facility] [address] [place] [address] N.    Please f/u with pt for further questions     Thank you     Action Taken: Message routed to:  [facility] eye    Travel Screening: Not Applicable

## 2023-05-16 ENCOUNTER — OFFICE VISIT (OUTPATIENT)
Dept: OPHTHALMOLOGY | Facility: CLINIC | Age: 78
End: 2023-05-16
Payer: COMMERCIAL

## 2023-05-16 DIAGNOSIS — Z98.890 HISTORY OF LASER IRIDOTOMY: ICD-10-CM

## 2023-05-16 DIAGNOSIS — H35.363 MACULAR DRUSEN, BILATERAL: ICD-10-CM

## 2023-05-16 DIAGNOSIS — G51.0 BELL'S PALSY: ICD-10-CM

## 2023-05-16 DIAGNOSIS — H20.13 CHRONIC IRIDOCYCLITIS, BILATERAL: ICD-10-CM

## 2023-05-16 DIAGNOSIS — H40.10X2 OPEN-ANGLE GLAUCOMA OF BOTH EYES, MODERATE STAGE, UNSPECIFIED OPEN-ANGLE GLAUCOMA TYPE: Primary | ICD-10-CM

## 2023-05-16 DIAGNOSIS — H26.492 POSTERIOR CAPSULAR OPACIFICATION VISUALLY SIGNIFICANT OF LEFT EYE: ICD-10-CM

## 2023-05-16 DIAGNOSIS — Z96.1 PSEUDOPHAKIA: ICD-10-CM

## 2023-05-16 DIAGNOSIS — H21.511: ICD-10-CM

## 2023-05-16 PROCEDURE — 92012 INTRM OPH EXAM EST PATIENT: CPT | Mod: 57 | Performed by: OPHTHALMOLOGY

## 2023-05-16 PROCEDURE — 66821 AFTER CATARACT LASER SURGERY: CPT | Mod: LT | Performed by: OPHTHALMOLOGY

## 2023-05-16 RX ORDER — PREDNISOLONE ACETATE 10 MG/ML
1 SUSPENSION/ DROPS OPHTHALMIC 2 TIMES DAILY
Qty: 5 ML | Refills: 0 | Status: SHIPPED | OUTPATIENT
Start: 2023-05-16 | End: 2023-08-31

## 2023-05-16 ASSESSMENT — TONOMETRY
OD_IOP_MMHG: 10
IOP_METHOD: APPLANATION
OS_IOP_MMHG: 20

## 2023-05-16 ASSESSMENT — VISUAL ACUITY
OS_CC: 20/80 SLOW
OD_CC: 20/25
OS_PH_CC: 20/50
CORRECTION_TYPE: GLASSES
METHOD: SNELLEN - LINEAR

## 2023-05-16 ASSESSMENT — EXTERNAL EXAM - LEFT EYE: OS_EXAM: NORMAL

## 2023-05-16 ASSESSMENT — SLIT LAMP EXAM - LIDS
COMMENTS: NORMAL
COMMENTS: NORMAL

## 2023-05-16 ASSESSMENT — CUP TO DISC RATIO: OS_RATIO: 0.6

## 2023-05-16 ASSESSMENT — EXTERNAL EXAM - RIGHT EYE: OD_EXAM: NORMAL

## 2023-05-16 NOTE — PATIENT INSTRUCTIONS
Continue:   Latanoprost (green top) every evening both eyes.  Brimonidine (purple top) twice daily both eyes. About 12 hours apart.   Continue Prednisolone twice daily right eye   Wait at least 5 minutes between drops if using more than one at a time.     Your eye may be slightly red, sore, and blurry for a few days.  You may notice some floaters for a few days.   Schedule a return visit for a intraocular pressure check and refraction in about one week.    William Zepeda M.D.  [phone]

## 2023-05-16 NOTE — LETTER
5/16/2023         RE: Loly Govea  [address] [place] [address]        Dear Colleague,    Thank you for referring your patient, Loly Govea, to the [facility]. Please see a copy of my visit note below.     Current Eye Medications:  Prednisolone drop twice daily right eye.   Latanoprost (green top) every evening both eyes.  Brimonidine (purple top) twice daily both eyes Systane BID both eyes     Subjective:  Here for IOP check. Things are going ok, does have to open wide to see far away and lift up the glasses to read.  Having migraines and dizzy spells from HTN, has pills for this. Takes six water pills a day as well.   [facility] last year said that she had PCO both eyes, feels left eye worse. Would like laser done if possible today, signed consent.     Discussed posterior vitreous detachment may appear with or without laser treatment left eye.     Objective:  See Ophthalmology Exam.       Assessment:  Intraocular pressure stable on current drop regimen.  Visually significant posterior capsule opacity left eye.       ICD-10-CM    1. Open-angle glaucoma of both eyes, moderate stage, unspecified open-angle glaucoma type  H40.10X2       2. Posterior capsular opacification visually significant of left eye  H26.492 YAG LASER CAPSULOTOMY      3. Chronic iridocyclitis, bilateral  H20.13 prednisoLONE acetate (PRED FORTE) 1 % ophthalmic suspension     YAG LASER CAPSULOTOMY      4. Bell's palsy  G51.0       5. 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 History of laser iridotomy, ou  Z98.890       7. Pseudophakia, ou  Z96.1       8. Macular drusen, bilateral  H35.363            Plan: Yag Capsulotomy performed without problems left eye under Proparacaine anesthetic.  Well tolerated.  Number of applications: 23  Power of applications: 1.4 mJ  Iopidine given.    William Zepeda M.D.                Again, thank you for allowing me to participate in the care of your  patient.        Sincerely,        William Zepeda MD

## 2023-05-16 NOTE — PROGRESS NOTES
Current Eye Medications:  Prednisolone drop twice daily right eye.   Latanoprost (green top) every evening both eyes.  Brimonidine (purple top) twice daily both eyes Systane BID both eyes     Subjective:  Here for IOP check. Things are going ok, does have to open wide to see far away and lift up the glasses to read.  Having migraines and dizzy spells from HTN, has pills for this. Takes six water pills a day as well.   [facility] last year said that she had PCO both eyes, feels left eye worse. Would like laser done if possible today, signed consent.     Discussed posterior vitreous detachment may appear with or without laser treatment left eye.     Objective:  See Ophthalmology Exam.       Assessment:  Intraocular pressure stable on current drop regimen.  Visually significant posterior capsule opacity left eye.       ICD-10-CM    1. Open-angle glaucoma of both eyes, moderate stage, unspecified open-angle glaucoma type  H40.10X2       2. Posterior capsular opacification visually significant of left eye  H26.492 YAG LASER CAPSULOTOMY      3. Chronic iridocyclitis, bilateral  H20.13 prednisoLONE acetate (PRED FORTE) 1 % ophthalmic suspension     YAG LASER CAPSULOTOMY      4. Bell's palsy  G51.0       5. 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 History of laser iridotomy, ou  Z98.890       7. Pseudophakia, ou  Z96.1       8. Macular drusen, bilateral  H35.363            Plan: Yag Capsulotomy performed without problems left eye under Proparacaine anesthetic.  Well tolerated.  Number of applications: 23  Power of applications: 1.4 mJ  Iopidine given.    William Zepeda M.D.

## 2023-05-17 ENCOUNTER — TELEPHONE (OUTPATIENT)
Dept: OPHTHALMOLOGY | Facility: CLINIC | Age: 78
End: 2023-05-17
Payer: COMMERCIAL

## 2023-05-17 NOTE — TELEPHONE ENCOUNTER
"[facility] Call Center    Phone Message    May a detailed message be left on voicemail: yes     Reason for Call: Appointment Intake    Referring Provider Name: Dr. William Zepeda  Diagnosis and/or Symptoms: \"Schedule a return visit for a intraocular pressure check and refraction in about one week.\" Per Appt from 5/16/2023.     Please call Pt back to schedule an Appt within a week. Writer is unable to schedule due to the next available being on 7/31/2023. Thank you!    Action Taken: Message routed to:  Other: [facility]    Travel Screening: Not Applicable                                                                      "

## 2023-05-25 ENCOUNTER — OFFICE VISIT (OUTPATIENT)
Dept: OPHTHALMOLOGY | Facility: CLINIC | Age: 78
End: 2023-05-25
Payer: COMMERCIAL

## 2023-05-25 DIAGNOSIS — Z96.1 PSEUDOPHAKIA: Primary | ICD-10-CM

## 2023-05-25 DIAGNOSIS — H04.123 DRY EYES: ICD-10-CM

## 2023-05-25 PROCEDURE — 99024 POSTOP FOLLOW-UP VISIT: CPT | Performed by: OPHTHALMOLOGY

## 2023-05-25 RX ORDER — LIFITEGRAST 50 MG/ML
1 SOLUTION/ DROPS OPHTHALMIC 2 TIMES DAILY
Qty: 60 EACH | Refills: 4 | Status: SHIPPED | OUTPATIENT
Start: 2023-05-25 | End: 2024-03-04

## 2023-05-25 ASSESSMENT — VISUAL ACUITY
CORRECTION_TYPE: GLASSES
OD_PH_CC: 20/40
OD_CC: 20/50
OS_CC: 20/50
METHOD: SNELLEN - LINEAR

## 2023-05-25 ASSESSMENT — REFRACTION_WEARINGRX
OS_SPHERE: -0.75
OD_SPHERE: -0.75
OS_ADD: +2.50
OD_CYLINDER: +0.75
OS_CYLINDER: SPHERE
SPECS_TYPE: BIFOCAL
OD_AXIS: 175
OD_ADD: +2.50

## 2023-05-25 ASSESSMENT — TONOMETRY
OD_IOP_MMHG: 09
IOP_METHOD: APPLANATION
OS_IOP_MMHG: 18

## 2023-05-25 ASSESSMENT — REFRACTION_MANIFEST
OS_SPHERE: -1.50
OS_CYLINDER: +0.25
OS_AXIS: 005
OS_ADD: +2.50

## 2023-05-25 NOTE — LETTER
"    5/25/2023         RE: Loly Govea  [address] [place] [address]        Dear Colleague,    Thank you for referring your patient, Loly Govea, to the [facility]. Please see a copy of my visit note below.     Current Eye Medications:  Latanoprost \":green\" - both eyes at bedtime - last dose: 9pm last night  Brimonidine \"purple\" - both eyes BID - last dose: 12pm today  Systane - both eyes QID  Prednisolone \"pink:\" - right eye BID      Subjective: post-op YAG Cap left eye 05/16/2023 - vision improved since laser. Wondering if she can restart restasis, felt that it helped but was not able to continue after changing to a different pharmacy.    [facility] preferred by insurance.      Objective:  See Ophthalmology Exam.       Assessment:  Doing well s/p Yag Caps left eye.  Dry eyes more symptomatic.      Plan:  Continue same medications.  Start Xiidra one drop both eyes twice daily.  Return visit 6 months for a complete exam.  William Zepeda M.D.  [phone]           Again, thank you for allowing me to participate in the care of your patient.        Sincerely,        William Zepeda MD    "

## 2023-05-25 NOTE — PROGRESS NOTES
" Current Eye Medications:  Latanoprost \":green\" - both eyes at bedtime - last dose: 9pm last night  Brimonidine \"purple\" - both eyes BID - last dose: 12pm today  Systane - both eyes QID  Prednisolone \"pink:\" - right eye BID      Subjective: post-op YAG Cap left eye 05/16/2023 - vision improved since laser. Wondering if she can restart restasis, felt that it helped but was not able to continue after changing to a different pharmacy.    [facility] preferred by insurance.      Objective:  See Ophthalmology Exam.       Assessment:  Doing well s/p Yag Caps left eye.  Dry eyes more symptomatic.      Plan:  Continue same medications.  Start Xiidra one drop both eyes twice daily.  Return visit 6 months for a complete exam.  William Zepeda M.D.  [phone]       "

## 2023-05-26 ASSESSMENT — EXTERNAL EXAM - RIGHT EYE: OD_EXAM: NORMAL

## 2023-05-26 ASSESSMENT — EXTERNAL EXAM - LEFT EYE: OS_EXAM: NORMAL

## 2023-05-26 ASSESSMENT — SLIT LAMP EXAM - LIDS
COMMENTS: NORMAL
COMMENTS: NORMAL

## 2023-06-01 ENCOUNTER — HEALTH MAINTENANCE LETTER (OUTPATIENT)
Age: 78
End: 2023-06-01

## 2023-08-31 DIAGNOSIS — H20.13 CHRONIC IRIDOCYCLITIS, BILATERAL: ICD-10-CM

## 2023-08-31 RX ORDER — PREDNISOLONE ACETATE 10 MG/ML
1 SUSPENSION/ DROPS OPHTHALMIC 2 TIMES DAILY
Qty: 5 ML | Refills: 0 | Status: SHIPPED | OUTPATIENT
Start: 2023-08-31 | End: 2024-03-04

## 2023-08-31 NOTE — TELEPHONE ENCOUNTER
"Refilling drops per Dr. Zepeda's last visit note Continue same medications. Prednisolone \"pink:\" - right eye BID    Patient has follow up scheduled 5/25/23.  "

## 2023-12-13 ENCOUNTER — APPOINTMENT (OUTPATIENT)
Dept: URBAN - METROPOLITAN AREA CLINIC 254 | Age: 78
Setting detail: DERMATOLOGY
End: 2023-12-18

## 2023-12-13 VITALS — WEIGHT: 202 LBS | HEIGHT: 55 IN

## 2023-12-13 DIAGNOSIS — L82.1 OTHER SEBORRHEIC KERATOSIS: ICD-10-CM

## 2023-12-13 DIAGNOSIS — D49.2 NEOPLASM OF UNSPECIFIED BEHAVIOR OF BONE, SOFT TISSUE, AND SKIN: ICD-10-CM

## 2023-12-13 DIAGNOSIS — Z71.89 OTHER SPECIFIED COUNSELING: ICD-10-CM

## 2023-12-13 DIAGNOSIS — D22 MELANOCYTIC NEVI: ICD-10-CM

## 2023-12-13 DIAGNOSIS — L57.8 OTHER SKIN CHANGES DUE TO CHRONIC EXPOSURE TO NONIONIZING RADIATION: ICD-10-CM

## 2023-12-13 DIAGNOSIS — L81.4 OTHER MELANIN HYPERPIGMENTATION: ICD-10-CM

## 2023-12-13 DIAGNOSIS — L85.3 XEROSIS CUTIS: ICD-10-CM

## 2023-12-13 DIAGNOSIS — D18.0 HEMANGIOMA: ICD-10-CM

## 2023-12-13 DIAGNOSIS — L82.0 INFLAMED SEBORRHEIC KERATOSIS: ICD-10-CM

## 2023-12-13 PROBLEM — D18.01 HEMANGIOMA OF SKIN AND SUBCUTANEOUS TISSUE: Status: ACTIVE | Noted: 2023-12-13

## 2023-12-13 PROBLEM — D22.5 MELANOCYTIC NEVI OF TRUNK: Status: ACTIVE | Noted: 2023-12-13

## 2023-12-13 PROCEDURE — OTHER ADDITIONAL NOTES: OTHER

## 2023-12-13 PROCEDURE — 99203 OFFICE O/P NEW LOW 30 MIN: CPT | Mod: 25

## 2023-12-13 PROCEDURE — OTHER BIOPSY BY SHAVE METHOD: OTHER

## 2023-12-13 PROCEDURE — OTHER COUNSELING: OTHER

## 2023-12-13 PROCEDURE — OTHER LIQUID NITROGEN: OTHER

## 2023-12-13 PROCEDURE — OTHER MIPS QUALITY: OTHER

## 2023-12-13 PROCEDURE — 17110 DESTRUCT B9 LESION 1-14: CPT

## 2023-12-13 PROCEDURE — 11102 TANGNTL BX SKIN SINGLE LES: CPT | Mod: 59

## 2023-12-13 ASSESSMENT — LOCATION SIMPLE DESCRIPTION DERM
LOCATION SIMPLE: RIGHT FOREARM
LOCATION SIMPLE: LEFT POSTERIOR UPPER ARM
LOCATION SIMPLE: LEFT UPPER BACK
LOCATION SIMPLE: LEFT FOREARM
LOCATION SIMPLE: RIGHT POSTERIOR UPPER ARM
LOCATION SIMPLE: RIGHT THIGH
LOCATION SIMPLE: LEFT PRETIBIAL REGION

## 2023-12-13 ASSESSMENT — LOCATION DETAILED DESCRIPTION DERM
LOCATION DETAILED: RIGHT PROXIMAL DORSAL FOREARM
LOCATION DETAILED: LEFT PROXIMAL DORSAL FOREARM
LOCATION DETAILED: RIGHT ANTERIOR DISTAL THIGH
LOCATION DETAILED: RIGHT PROXIMAL POSTERIOR UPPER ARM
LOCATION DETAILED: LEFT PROXIMAL POSTERIOR UPPER ARM
LOCATION DETAILED: LEFT SUPERIOR MEDIAL UPPER BACK
LOCATION DETAILED: LEFT MEDIAL UPPER BACK
LOCATION DETAILED: LEFT PROXIMAL PRETIBIAL REGION

## 2023-12-13 ASSESSMENT — LOCATION ZONE DERM
LOCATION ZONE: LEG
LOCATION ZONE: TRUNK
LOCATION ZONE: ARM

## 2023-12-13 NOTE — PROCEDURE: MIPS QUALITY
Quality 431: Preventive Care And Screening: Unhealthy Alcohol Use - Screening: Patient not identified as an unhealthy alcohol user when screened for unhealthy alcohol use using a systematic screening method
Detail Level: Detailed
Quality 110: Preventive Care And Screening: Influenza Immunization: Influenza Immunization previously received during influenza season
Quality 47: Advance Care Plan: Advance Care Planning discussed and documented; advance care plan or surrogate decision maker documented in the medical record.
Quality 130: Documentation Of Current Medications In The Medical Record: Current Medications Documented

## 2023-12-13 NOTE — HPI: FULL BODY SKIN EXAMINATION
How Severe Are Your Spot(S)?: mild
What Type Of Note Output Would You Prefer (Optional)?: Bullet Format
What Is The Reason For Today's Visit?: Full Body Skin Examination
What Is The Reason For Today's Visit? (Being Monitored For X): concerning skin lesions on an annual basis
Additional History: Patient reports no concerning lesions at this time. Notes ongoing dryness to her skin and is looking for recommendations on this.

## 2023-12-13 NOTE — PROCEDURE: ADDITIONAL NOTES
Additional Notes: Provided patient with OTC moisturizers and cleansers
Detail Level: Simple
Render Risk Assessment In Note?: no

## 2023-12-13 NOTE — PROCEDURE: BIOPSY BY SHAVE METHOD
Additional Anesthesia Volume In Cc (Will Not Render If 0): 0
Biopsy Method: Dermablade
Dressing: bandage
Hide Additional Size Dimension?: Yes
Bill For Surgical Tray: no
Cryotherapy Text: The wound bed was treated with cryotherapy after the biopsy was performed.
Post-Care Instructions: WOUND CARE:\\nDo NOT submerge wound in a bath, swimming pool, or hot tub for at least 1 week or for as long as there is an open wound. Gently cleanse the site daily with mild soap and water. Be careful NOT to allow a forceful stream of water to hit the biopsy site. After cleaning/showering, apply a thin layer of petrolatum ointment or Aquaphor in the wound followed by an adhesive bandage. Continue this process daily until healed. \\n\\nBLEEDING:\\nIf you develop persistent bleeding, apply firm and steady pressure over the dressing with gauze for 15 minutes. If bleeding persists, reapply pressure with an ice pack over the gauze for 15 minutes. NEVER APPLY ICE DIRECTLY TO THE SKIN. Do NOT peek under the gauze during these 15 minutes of pressure.  Call our office at [phone] if you cannot get the bleeding to stop. \\n\\nINFECTION:\\nSigns of infection may include increasing rather than decreasing pain (after the first few days), increasing redness/swelling/heat, draining pus, pink/red streaks around the wound, and fever or chills.  Please call our office immediately at [phone] if infection is suspected. It is normal to have some mild redness on or around the wound edges; this will lighten day by day and will become less tender.\\n\\nPAIN:\\nPain is usually minimal, but if needed you may take acetaminophen (Tylenol) every four hours as needed. Applying an ice pack over the dressing for 15-20 minutes every 2-3 hours will relieve swelling, lessen pain, and help minimize bruising. NEVER APPLY ICE DIRECTLY TO THE SKIN. Avoid ibuprofen (Advil, Motrin) and naproxen (Aleve) for the first 48 hours as these can increase bleeding.\\n\\nSWELLING AND BRUISING:\\nSwelling and bruising are common and temporary, usually lasting 1 - 2 weeks. It is more common in areas treated around the eyes, hands, and feet. In the days following the procedure, swelling and bruising can be minimized by keeping the affected areas elevated when possible, reducing salty foods, and applying ice packs over the dressing for 15-20 minutes every 2-3 hours. NEVER APPLY ICE DIRECTLY TO THE SKIN.\\n\\nITCHING:\\nItchiness on and around the wound is very common and can last several days to weeks after surgery. Mild itch is normal as the wound is healing. \\n\\nNERVE CHANGES:\\nNumbness is usually temporary, but it may last for several weeks to months. You may also experience sharp pains at the wound sight as it heals.  Mild pain is normal and will gradually improve with time.\\n \\nNO SMOKING:\\nSmoking will delay the healing process. If you smoke, we recommend trying to quit or at minimum reduce how much you smoke following a procedure.
Electrodesiccation Text: The wound bed was treated with electrodesiccation after the biopsy was performed.
Anesthesia Volume In Cc: 0.3
Notification Instructions: - It can take up to 2 -3 weeks to get a biopsy result. \\n- Please refrain from calling to request results until after 2 weeks.
Lab: -[id number]
Electrodesiccation And Curettage Text: The wound bed was treated with electrodesiccation and curettage after the biopsy was performed.
Hemostasis: Drysol
Detail Level: Detailed
Type Of Destruction Used: Curettage
Silver Nitrate Text: The wound bed was treated with silver nitrate after the biopsy was performed.
Wound Care: Aquaphor
Information: Selecting Yes will display possible errors in your note based on the variables you have selected. This validation is only offered as a suggestion for you. PLEASE NOTE THAT THE VALIDATION TEXT WILL BE REMOVED WHEN YOU FINALIZE YOUR NOTE. IF YOU WANT TO FAX A PRELIMINARY NOTE YOU WILL NEED TO TOGGLE THIS TO 'NO' IF YOU DO NOT WANT IT IN YOUR FAXED NOTE.
Biopsy Type: H and E
Consent: - Consent was obtained and risks were reviewed prior to procedure today. All questions were answered prior to procedure today.\\n- Risks discussed include but are not limited to scarring, infection, bleeding, scabbing, incomplete removal, nerve damage, pain, and allergy to anesthesia.
Anesthesia Type: 1% lidocaine with epinephrine
Depth Of Biopsy: dermis
Curettage Text: The wound bed was treated with curettage after the biopsy was performed.
Billing Type: Third-Party Bill

## 2023-12-13 NOTE — PROCEDURE: LIQUID NITROGEN
Medical Necessity Clause: This procedure was medically necessary because the lesions that were treated were:
Consent: - Consent was obtained and risks were reviewed prior to procedure today. All questions were answered prior to procedure today.\\n- Risks discussed include but are not limited to pain, crusting, scabbing, blistering, scarring, temporary or permanent darker or lighter pigmentary change, recurrence, incomplete resolution, and infection.
Detail Level: Detailed
Show Aperture Variable?: Yes
Application Tool (Optional): Liquid Nitrogen Sprayer
Medical Necessity Information: It is in your best interest to select a reason for this procedure from the list below. All of these items fulfill various CMS LCD requirements except the new and changing color options.
Spray Paint Text: The liquid nitrogen was applied to the skin utilizing a spray paint frosting technique.
Spray Paint Technique: No
Post-Care Instructions: - Avoid picking at any of the treated lesions.\\n- Blisters should not be popped. However should a blister rupture, cover it with Vaseline ointment or Aquaphor and a bandage until healed.

## 2023-12-13 NOTE — PROCEDURE: COUNSELING
Detail Level: Generalized
Detail Level: Detailed
Patient Specific Counseling (Will Not Stick From Patient To Patient): - Discussed that this nevus has atypical features that warrant a biopsy.\\n- Patient expressed understanding.\\n- Follow-up for re-examination for regimentation or an additional removal procedure may become necessary depending the pathologist's report.
Patient Specific Counseling (Will Not Stick From Patient To Patient): - Seborrheic keratoses are benign growths.\\n- Patients get more of them as they age, and these may grow slowly over time.\\n- Some seborrheic keratoses (particularly larger lesions) require multiple treatments.\\n- Return to clinic should any treated growths not be resolved within 4 weeks.

## 2024-03-04 DIAGNOSIS — H20.13 CHRONIC IRIDOCYCLITIS, BILATERAL: ICD-10-CM

## 2024-03-04 DIAGNOSIS — H40.10X2 OPEN-ANGLE GLAUCOMA OF BOTH EYES, MODERATE STAGE, UNSPECIFIED OPEN-ANGLE GLAUCOMA TYPE: ICD-10-CM

## 2024-03-04 DIAGNOSIS — H04.123 DRY EYES: ICD-10-CM

## 2024-03-04 RX ORDER — PREDNISOLONE ACETATE 10 MG/ML
1 SUSPENSION/ DROPS OPHTHALMIC 2 TIMES DAILY
Qty: 5 ML | Refills: 2 | Status: SHIPPED | OUTPATIENT
Start: 2024-03-04

## 2024-03-04 RX ORDER — LIFITEGRAST 50 MG/ML
1 SOLUTION/ DROPS OPHTHALMIC 2 TIMES DAILY
Qty: 60 EACH | Refills: 1 | Status: SHIPPED | OUTPATIENT
Start: 2024-03-04

## 2024-03-04 RX ORDER — LATANOPROST 50 UG/ML
1 SOLUTION/ DROPS OPHTHALMIC AT BEDTIME
Qty: 7.5 ML | Refills: 1 | Status: SHIPPED | OUTPATIENT
Start: 2024-03-04

## 2024-03-04 NOTE — TELEPHONE ENCOUNTER
Appointment reminder added to refill approval.  The patient was due for an appointment in November 2023.  No future appointments scheduled.

## 2024-04-11 DIAGNOSIS — H40.10X2 OPEN-ANGLE GLAUCOMA OF BOTH EYES, MODERATE STAGE, UNSPECIFIED OPEN-ANGLE GLAUCOMA TYPE: ICD-10-CM

## 2024-04-11 RX ORDER — BRIMONIDINE TARTRATE 1.5 MG/ML
1 SOLUTION/ DROPS OPHTHALMIC 2 TIMES DAILY
Qty: 15 ML | Refills: 1 | Status: SHIPPED | OUTPATIENT
Start: 2024-04-11

## 2024-04-11 NOTE — TELEPHONE ENCOUNTER
Received refill request for Brimonidine BID both eyes. Will send one but remind patient she is almost three months overdue.

## 2024-06-16 ENCOUNTER — HEALTH MAINTENANCE LETTER (OUTPATIENT)
Age: 79
End: 2024-06-16

## 2025-02-05 DIAGNOSIS — H04.123 DRY EYES: ICD-10-CM

## 2025-02-05 RX ORDER — LIFITEGRAST 50 MG/ML
1 SOLUTION/ DROPS OPHTHALMIC 2 TIMES DAILY
Qty: 60 EACH | Refills: 11 | Status: SHIPPED | OUTPATIENT
Start: 2025-02-05

## 2025-02-05 NOTE — TELEPHONE ENCOUNTER
Last appointment:  5-25-23.  No future appointments scheduled.  Dr. Zepeda approves to continue to refill.   Appointment reminder added to refill.

## 2025-06-19 DIAGNOSIS — H40.10X2 OPEN-ANGLE GLAUCOMA OF BOTH EYES, MODERATE STAGE, UNSPECIFIED OPEN-ANGLE GLAUCOMA TYPE: ICD-10-CM

## 2025-06-19 RX ORDER — BRIMONIDINE TARTRATE 1.5 MG/ML
1 SOLUTION/ DROPS OPHTHALMIC 2 TIMES DAILY
Qty: 15 ML | Refills: 1 | Status: SHIPPED | OUTPATIENT
Start: 2025-06-19

## 2025-06-19 NOTE — TELEPHONE ENCOUNTER
[facility] requesting additional refills for Brimonidine. Last seen 5/25/23. No upcoming appts. LVM with pt requesting pt call to schedule appt.

## 2025-06-21 ENCOUNTER — HEALTH MAINTENANCE LETTER (OUTPATIENT)
Age: 80
End: 2025-06-21

## 2025-06-23 DIAGNOSIS — H20.13 CHRONIC IRIDOCYCLITIS, BILATERAL: ICD-10-CM

## 2025-06-23 RX ORDER — PREDNISOLONE ACETATE 10 MG/ML
1 SUSPENSION/ DROPS OPHTHALMIC 2 TIMES DAILY
Qty: 5 ML | Refills: 0 | Status: SHIPPED | OUTPATIENT
Start: 2025-06-23

## 2025-06-23 NOTE — TELEPHONE ENCOUNTER
Refill requested for Prednisolone eye drops. Will send one refill, she hasn't been in since 5-2023.  Reminded her to call for appointment.

## 2025-07-01 DIAGNOSIS — H40.10X2 OPEN-ANGLE GLAUCOMA OF BOTH EYES, MODERATE STAGE, UNSPECIFIED OPEN-ANGLE GLAUCOMA TYPE: ICD-10-CM

## 2025-07-01 RX ORDER — LATANOPROST 50 UG/ML
1 SOLUTION/ DROPS OPHTHALMIC AT BEDTIME
Qty: 7.5 ML | Refills: 0 | Status: SHIPPED | OUTPATIENT
Start: 2025-07-01 | End: 2025-07-03

## 2025-07-03 PROBLEM — I48.91 ATRIAL FIBRILLATION WITH RAPID VENTRICULAR RESPONSE (H): Status: ACTIVE | Noted: 2017-08-27

## 2025-07-03 PROBLEM — Z95.0 CARDIAC PACEMAKER IN SITU: Status: ACTIVE | Noted: 2017-08-27

## 2025-07-03 PROBLEM — J45.909 ASTHMA: Status: ACTIVE | Noted: 2022-05-31

## 2025-07-03 PROBLEM — R00.1 BRADYCARDIA: Status: ACTIVE | Noted: 2025-07-03

## 2025-07-03 PROBLEM — J44.9 CHRONIC OBSTRUCTIVE PULMONARY DISEASE (H): Status: ACTIVE | Noted: 2018-09-29

## 2025-07-04 PROBLEM — H04.123 DRY EYE SYNDROME, BILATERAL: Status: ACTIVE | Noted: 2025-07-04

## 2025-07-21 ENCOUNTER — APPOINTMENT (OUTPATIENT)
Dept: OPTOMETRY | Facility: CLINIC | Age: 80
End: 2025-07-21
Payer: COMMERCIAL

## 2025-07-21 PROCEDURE — 92341 FIT SPECTACLES BIFOCAL: CPT | Performed by: OPHTHALMOLOGY
